# Patient Record
Sex: FEMALE | Race: WHITE | NOT HISPANIC OR LATINO | Employment: UNEMPLOYED | URBAN - METROPOLITAN AREA
[De-identification: names, ages, dates, MRNs, and addresses within clinical notes are randomized per-mention and may not be internally consistent; named-entity substitution may affect disease eponyms.]

---

## 2022-07-30 ENCOUNTER — HOSPITAL ENCOUNTER (EMERGENCY)
Facility: HOSPITAL | Age: 32
Discharge: HOME | End: 2022-07-30
Attending: FAMILY MEDICINE

## 2022-07-30 VITALS
SYSTOLIC BLOOD PRESSURE: 110 MMHG | RESPIRATION RATE: 18 BRPM | HEART RATE: 97 BPM | DIASTOLIC BLOOD PRESSURE: 91 MMHG | OXYGEN SATURATION: 98 %

## 2022-07-30 DIAGNOSIS — F41.9 ANXIETY: ICD-10-CM

## 2022-07-30 DIAGNOSIS — R11.10 VOMITING: ICD-10-CM

## 2022-07-30 DIAGNOSIS — F10.929 ALCOHOL INTOXICATION (HCC): Primary | ICD-10-CM

## 2022-07-30 LAB
ALBUMIN SERPL BCP-MCNC: 4.4 G/DL (ref 3.5–5)
ALP SERPL-CCNC: 41 U/L (ref 34–104)
ALT SERPL W P-5'-P-CCNC: 18 U/L (ref 7–52)
AMPHETAMINES SERPL QL SCN: POSITIVE
ANION GAP SERPL CALCULATED.3IONS-SCNC: 8 MMOL/L (ref 4–13)
AST SERPL W P-5'-P-CCNC: 21 U/L (ref 13–39)
BARBITURATES UR QL: NEGATIVE
BASOPHILS # BLD AUTO: 0.09 THOUSANDS/ΜL (ref 0–0.1)
BASOPHILS NFR BLD AUTO: 1 % (ref 0–1)
BENZODIAZ UR QL: NEGATIVE
BILIRUB SERPL-MCNC: 0.34 MG/DL (ref 0.2–1)
BILIRUB UR QL STRIP: NEGATIVE
BUN SERPL-MCNC: 11 MG/DL (ref 5–25)
CALCIUM SERPL-MCNC: 8.1 MG/DL (ref 8.4–10.2)
CHLORIDE SERPL-SCNC: 108 MMOL/L (ref 96–108)
CLARITY UR: CLEAR
CO2 SERPL-SCNC: 25 MMOL/L (ref 21–32)
COCAINE UR QL: NEGATIVE
COLOR UR: YELLOW
CREAT SERPL-MCNC: 0.66 MG/DL (ref 0.6–1.3)
EOSINOPHIL # BLD AUTO: 0.14 THOUSAND/ΜL (ref 0–0.61)
EOSINOPHIL NFR BLD AUTO: 2 % (ref 0–6)
ERYTHROCYTE [DISTWIDTH] IN BLOOD BY AUTOMATED COUNT: 12.2 % (ref 11.6–15.1)
ETHANOL SERPL-MCNC: 295 MG/DL
EXT PREG TEST URINE: NEGATIVE
EXT. CONTROL ED NAV: NORMAL
FLUAV RNA RESP QL NAA+PROBE: NEGATIVE
FLUBV RNA RESP QL NAA+PROBE: NEGATIVE
GFR SERPL CREATININE-BSD FRML MDRD: 119 ML/MIN/1.73SQ M
GLUCOSE SERPL-MCNC: 109 MG/DL (ref 65–140)
GLUCOSE SERPL-MCNC: 88 MG/DL (ref 65–140)
GLUCOSE UR STRIP-MCNC: NEGATIVE MG/DL
HCT VFR BLD AUTO: 41.6 % (ref 34.8–46.1)
HGB BLD-MCNC: 13.6 G/DL (ref 11.5–15.4)
HGB UR QL STRIP.AUTO: NEGATIVE
IMM GRANULOCYTES # BLD AUTO: 0.02 THOUSAND/UL (ref 0–0.2)
IMM GRANULOCYTES NFR BLD AUTO: 0 % (ref 0–2)
KETONES UR STRIP-MCNC: NEGATIVE MG/DL
LEUKOCYTE ESTERASE UR QL STRIP: NEGATIVE
LIPASE SERPL-CCNC: 52 U/L (ref 11–82)
LYMPHOCYTES # BLD AUTO: 2.64 THOUSANDS/ΜL (ref 0.6–4.47)
LYMPHOCYTES NFR BLD AUTO: 35 % (ref 14–44)
MCH RBC QN AUTO: 32.2 PG (ref 26.8–34.3)
MCHC RBC AUTO-ENTMCNC: 32.7 G/DL (ref 31.4–37.4)
MCV RBC AUTO: 99 FL (ref 82–98)
METHADONE UR QL: NEGATIVE
MONOCYTES # BLD AUTO: 0.55 THOUSAND/ΜL (ref 0.17–1.22)
MONOCYTES NFR BLD AUTO: 7 % (ref 4–12)
NEUTROPHILS # BLD AUTO: 4.14 THOUSANDS/ΜL (ref 1.85–7.62)
NEUTS SEG NFR BLD AUTO: 55 % (ref 43–75)
NITRITE UR QL STRIP: NEGATIVE
NRBC BLD AUTO-RTO: 0 /100 WBCS
OPIATES UR QL SCN: NEGATIVE
OXYCODONE+OXYMORPHONE UR QL SCN: NEGATIVE
PCP UR QL: NEGATIVE
PH UR STRIP.AUTO: 6 [PH]
PLATELET # BLD AUTO: 273 THOUSANDS/UL (ref 149–390)
PMV BLD AUTO: 9.4 FL (ref 8.9–12.7)
POTASSIUM SERPL-SCNC: 3.5 MMOL/L (ref 3.5–5.3)
PROT SERPL-MCNC: 7 G/DL (ref 6.4–8.4)
PROT UR STRIP-MCNC: NEGATIVE MG/DL
RBC # BLD AUTO: 4.22 MILLION/UL (ref 3.81–5.12)
RSV RNA RESP QL NAA+PROBE: NEGATIVE
SARS-COV-2 RNA RESP QL NAA+PROBE: NEGATIVE
SODIUM SERPL-SCNC: 141 MMOL/L (ref 135–147)
SP GR UR STRIP.AUTO: 1.01 (ref 1–1.03)
THC UR QL: POSITIVE
UROBILINOGEN UR QL STRIP.AUTO: 0.2 E.U./DL
WBC # BLD AUTO: 7.58 THOUSAND/UL (ref 4.31–10.16)

## 2022-07-30 PROCEDURE — 81025 URINE PREGNANCY TEST: CPT | Performed by: FAMILY MEDICINE

## 2022-07-30 PROCEDURE — 99284 EMERGENCY DEPT VISIT MOD MDM: CPT

## 2022-07-30 PROCEDURE — 82948 REAGENT STRIP/BLOOD GLUCOSE: CPT

## 2022-07-30 PROCEDURE — 36415 COLL VENOUS BLD VENIPUNCTURE: CPT | Performed by: FAMILY MEDICINE

## 2022-07-30 PROCEDURE — 96374 THER/PROPH/DIAG INJ IV PUSH: CPT

## 2022-07-30 PROCEDURE — 85025 COMPLETE CBC W/AUTO DIFF WBC: CPT | Performed by: FAMILY MEDICINE

## 2022-07-30 PROCEDURE — 96361 HYDRATE IV INFUSION ADD-ON: CPT

## 2022-07-30 PROCEDURE — 0241U HB NFCT DS VIR RESP RNA 4 TRGT: CPT | Performed by: FAMILY MEDICINE

## 2022-07-30 PROCEDURE — 96375 TX/PRO/DX INJ NEW DRUG ADDON: CPT

## 2022-07-30 PROCEDURE — 99285 EMERGENCY DEPT VISIT HI MDM: CPT | Performed by: FAMILY MEDICINE

## 2022-07-30 PROCEDURE — 81003 URINALYSIS AUTO W/O SCOPE: CPT | Performed by: FAMILY MEDICINE

## 2022-07-30 PROCEDURE — 83690 ASSAY OF LIPASE: CPT | Performed by: EMERGENCY MEDICINE

## 2022-07-30 PROCEDURE — 82075 ASSAY OF BREATH ETHANOL: CPT | Performed by: FAMILY MEDICINE

## 2022-07-30 PROCEDURE — 82077 ASSAY SPEC XCP UR&BREATH IA: CPT | Performed by: FAMILY MEDICINE

## 2022-07-30 PROCEDURE — 80053 COMPREHEN METABOLIC PANEL: CPT | Performed by: EMERGENCY MEDICINE

## 2022-07-30 PROCEDURE — 80307 DRUG TEST PRSMV CHEM ANLYZR: CPT | Performed by: FAMILY MEDICINE

## 2022-07-30 RX ORDER — ONDANSETRON 2 MG/ML
INJECTION INTRAMUSCULAR; INTRAVENOUS
Status: COMPLETED
Start: 2022-07-30 | End: 2022-07-30

## 2022-07-30 RX ORDER — ONDANSETRON 2 MG/ML
4 INJECTION INTRAMUSCULAR; INTRAVENOUS ONCE
Status: COMPLETED | OUTPATIENT
Start: 2022-07-30 | End: 2022-07-30

## 2022-07-30 RX ORDER — LORAZEPAM 2 MG/ML
0.5 INJECTION INTRAMUSCULAR ONCE
Status: COMPLETED | OUTPATIENT
Start: 2022-07-30 | End: 2022-07-30

## 2022-07-30 RX ADMIN — SODIUM CHLORIDE 1000 ML: 0.9 INJECTION, SOLUTION INTRAVENOUS at 16:20

## 2022-07-30 RX ADMIN — LORAZEPAM 0.5 MG: 2 INJECTION INTRAMUSCULAR; INTRAVENOUS at 16:23

## 2022-07-30 RX ADMIN — ONDANSETRON 4 MG: 2 INJECTION INTRAMUSCULAR; INTRAVENOUS at 16:20
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
## 2022-08-01 ENCOUNTER — TELEPHONE (OUTPATIENT)
Dept: ADMISSIONS | Facility: HOSPITAL | Age: 32
End: 2022-08-01
Payer: COMMERCIAL
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
## 2022-08-02 ENCOUNTER — TELEPHONE (OUTPATIENT)
Dept: PSYCHIATRY | Facility: HOSPITAL | Age: 32
End: 2022-08-02
Payer: COMMERCIAL

## 2022-08-02 NOTE — TELEPHONE ENCOUNTER
"LCSW called and spoke to pt regarding PHP evals scheduled for tomorrow.  Pt is aware [facility] PHP is abstinence based and reports she will feels able to maintain her sobriety at this time.  Reports \"strong\" family support system to assist in maintaining sobriety.  Pt reports she can stay away from substances and only struggles with then \"when everything is falling apart\" and is hopeful to gain more skills to cope.  LCSW explained evaluations tomorrow are to ensure our PHP is the best fit for her needs at this time and if her needs change in the future - such as she finds it difficult to maintain sobriety - we may have to look at alternative options for treatment.  Pt expressed understanding of the above.  Pt shared she is prescribed Vyvanse and inquired if that medication was permissible at the [facility].  LCSW explained it is, however, we recommended all pts to abstain from any non-prescribed substances.  Pt reports last use of THC was 4 days ago and shared concerns it would still show up in her system.  LCSW shared expectation is for her to remain sober moving forward.  Pt expressed understanding.  Pt shared she has address for [facility] and plans to attend evals tomorrow.       "

## 2022-08-03 ENCOUNTER — PHP (OUTPATIENT)
Dept: PSYCHIATRY | Facility: HOSPITAL | Age: 32
End: 2022-08-03
Attending: SOCIAL WORKER
Payer: COMMERCIAL

## 2022-08-03 ENCOUNTER — APPOINTMENT (OUTPATIENT)
Dept: LAB | Facility: CLINIC | Age: 32
End: 2022-08-03
Attending: NURSE PRACTITIONER
Payer: COMMERCIAL

## 2022-08-03 ENCOUNTER — NURSE ONLY (OUTPATIENT)
Dept: PSYCHIATRY | Facility: HOSPITAL | Age: 32
End: 2022-08-03
Payer: COMMERCIAL

## 2022-08-03 VITALS
OXYGEN SATURATION: 100 % | HEIGHT: 64 IN | RESPIRATION RATE: 18 BRPM | SYSTOLIC BLOOD PRESSURE: 124 MMHG | DIASTOLIC BLOOD PRESSURE: 94 MMHG | HEART RATE: 86 BPM

## 2022-08-03 DIAGNOSIS — F31.81 BIPOLAR II DISORDER (CMS/HCC): Primary | ICD-10-CM

## 2022-08-03 DIAGNOSIS — F13.20 MODERATE BENZODIAZEPINE USE DISORDER (CMS/HCC): ICD-10-CM

## 2022-08-03 DIAGNOSIS — F42.2 MIXED OBSESSIONAL THOUGHTS AND ACTS: ICD-10-CM

## 2022-08-03 DIAGNOSIS — F12.10 CANNABIS USE DISORDER, MILD, ABUSE: ICD-10-CM

## 2022-08-03 DIAGNOSIS — F10.20 ALCOHOL USE DISORDER, MODERATE, DEPENDENCE (CMS/HCC): ICD-10-CM

## 2022-08-03 DIAGNOSIS — F13.10: ICD-10-CM

## 2022-08-03 DIAGNOSIS — F41.9 ANXIETY DISORDER, UNSPECIFIED TYPE: ICD-10-CM

## 2022-08-03 LAB
AMPHET UR QL SCN: POSITIVE
BARBITURATES UR QL SCN: NOT DETECTED
BENZODIAZ UR QL SCN: NOT DETECTED
CANNABINOIDS UR QL SCN: NOT DETECTED
COCAINE UR QL SCN: NOT DETECTED
OPIATES UR QL SCN: NOT DETECTED
PCP UR QL SCN: NOT DETECTED

## 2022-08-03 PROCEDURE — 80307 DRUG TEST PRSMV CHEM ANLYZR: CPT | Performed by: NURSE PRACTITIONER

## 2022-08-03 PROCEDURE — 90792 PSYCH DIAG EVAL W/MED SRVCS: CPT | Performed by: NURSE PRACTITIONER

## 2022-08-03 PROCEDURE — 90791 PSYCH DIAGNOSTIC EVALUATION: CPT

## 2022-08-03 RX ORDER — LISDEXAMFETAMINE DIMESYLATE 60 MG/1
60 CAPSULE ORAL
COMMUNITY
Start: 2022-07-16

## 2022-08-03 RX ORDER — ALPRAZOLAM 0.5 MG/1
0.5 TABLET ORAL AS NEEDED
COMMUNITY
Start: 2022-07-16 | End: 2022-08-03

## 2022-08-03 RX ORDER — SERTRALINE HYDROCHLORIDE 100 MG/1
100 TABLET, FILM COATED ORAL
COMMUNITY
Start: 2022-07-15 | End: 2022-08-03

## 2022-08-03 ASSESSMENT — COGNITIVE AND FUNCTIONAL STATUS - GENERAL
THOUGHT_PROCESS: WORRY
LIBIDO: NON-CONTRIBUTORY
PERCEPTUAL FUNCTION: NORMAL
IMPULSE CONTROL: IMPAIRED, MODERATELY
AROUSAL LEVEL: ALERT
THOUGHT_CONTENT: GUARDED;SUSPICIOUS
RECENT MEMORY: WNL
ORIENTATION: FULLY ORIENTED
EYE_CONTACT: WNL
DELUSIONS: NONE OR AGE APPROPRIATE
SLEEP_WAKE_CYCLE: DECREASED
CONCENTRATION: WNL
EST. PREMORBID INTELLIGENCE: AVERAGE
APPEARANCE: WELL GROOMED
AFFECT: FULL RANGE;TEARFUL
PSYCHOMOTOR FUNCTIONING: WNL
INSIGHT: IMPAIRED, MODERATELY
APPETITE: DECREASED
MOOD: ANXIOUS;DEPRESSED
REMOTE MEMORY: WNL
ATTENTION: WNL
SPEECH: REGULAR

## 2022-08-03 ASSESSMENT — ENCOUNTER SYMPTOMS
NAUSEA: 1
HEADACHES: 0
NEUROLOGICAL NEGATIVE: 1
ENDOCRINE NEGATIVE: 1
EYES NEGATIVE: 1
SLEEP DISTURBANCE: 1
DIAPHORESIS: 0
APPETITE CHANGE: 1
MUSCULOSKELETAL NEGATIVE: 1
FATIGUE: 1
DYSPHORIC MOOD: 1
RESPIRATORY NEGATIVE: 1
DIZZINESS: 0
HEMATOLOGIC/LYMPHATIC NEGATIVE: 1
NAUSEA: 0
CARDIOVASCULAR NEGATIVE: 1
UNEXPECTED WEIGHT CHANGE: 1
NERVOUS/ANXIOUS: 1
TREMORS: 1

## 2022-08-03 ASSESSMENT — PAIN SCALES - GENERAL: PAINLEVEL: 0-NO PAIN

## 2022-08-03 NOTE — PROGRESS NOTES
"INITIAL PSYCHIATRY NURSING ASSESSMENT    Renate is a 32 y/o referred to PHP by [facility] for anxiety depression.  Renate endorses current anxiety and depression.  She denies SI, HI, AVH and SIB.  Renate endorses substance use daily marijuana, wine or beer 3 times weekly 3 glasses per episode, history of benzodiazepine misuse does not endorse recent use.  Renate presents pleasant, anxious affect and mood; ADL's maintained, eye contact good, responses to assessment questions varied from dismissive to explanatory.  Renate states that she has been able to maintain ADL's without issue, is having difficulty with completing tasks and sometimes feels overwhelmed by task, states she is not getting proper sleep balance and appetite has been effected in recent months where she had lost 10-15 pounds since February but has since regained weight to what she states is her baseline weight.     PHP notification letter yes    Covid Vacc status reports being vaccinated  Recent exposure denies        History Reviewed: Yes, no changes.        Review of Systems   Constitutional: Positive for appetite change and unexpected weight change.        Lost 10-15 pounds in feb has since regained   HENT: Negative.    Eyes: Negative.         Uses glasses and contacts     Respiratory: Negative.    Cardiovascular: Negative.    Gastrointestinal: Positive for nausea.        During times of anxiety experiences nausea   Endocrine: Negative.    Genitourinary: Negative.    Musculoskeletal: Negative.    Skin: Negative.    Allergic/Immunologic: Positive for environmental allergies.   Neurological: Negative.    Hematological: Negative.    Psychiatric/Behavioral: Positive for dysphoric mood and sleep disturbance. The patient is nervous/anxious.         Depression       Vitals:    08/03/22 1047   BP: (!) 124/94   BP Location: Right upper arm   Patient Position: Sitting   Pulse: 86   Resp: 18   SpO2: 100%   Height: 1.626 m (5' 4\")   PainSc: 0-No pain       Physical " Exam    Labs Reviewed  I have reviewed the patient's labs.  Significant abnormals are Ethanol Lvl <10 mg/dL 295 High   . on 7/30/22    Does the patient worry about falling?  no  Has the patient fallen in the last 3 months?  no    Screenings done this visit:         PHQ-9: Total Score-OWL Transcribed: 15  Thoughts that You Would be Better Off Dead or of Hurting Yourself in Some Way: 0-->not at all  MONICA-7 Total Score-OWL Transcribed: 18            Metabolic Monitoring Log Completed/Updated:  Not Indicated    Assessment/Plan: PHP pending UDS results     Time spent with patient:  20  Lizzeth Cristina RN @ 10:48 AM

## 2022-08-03 NOTE — PROGRESS NOTES
Report of last drink 7/30/2022 unk amount of gin    A&O x3; no HA; no A/V or tactile hallucinations; no change in night sweats (has been experiencing night sweats in relation to mass on thyroid), no change in anxiety (no increase), no n/v    Fine tremors felt left hand; hypertension 124/94
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
## 2022-08-03 NOTE — PROGRESS NOTES
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 F/U with treatment goals as outlined in treatment plan.  Patient to F/U with local ED or call 911 should SI/HI arise.  Visit Diagnosis:    ICD-10-CM ICD-9-CM   1. Bipolar II disorder (CMS/HCC)  F31.81 296.89   2. Anxiety disorder, unspecified type  F41.9 300.00   3. Alcohol use disorder, moderate, dependence (CMS/HCC)  F10.20 303.90   4. Cannabis use disorder, mild, abuse  F12.10 305.20   5. Mild benzodiazepine use disorder (CMS/HCC)  F13.10 305.40       Time  Start Time: 0835  End Time: 1040  Total Time: 125  Radha Carpenter, LCSW @ 1:22 PM

## 2022-08-04 ENCOUNTER — TELEPHONE (OUTPATIENT)
Dept: PSYCHIATRY | Facility: HOSPITAL | Age: 32
End: 2022-08-04
Payer: COMMERCIAL

## 2022-08-04 NOTE — TELEPHONE ENCOUNTER
LCSW called and LVM for pt informing her she can start PHP group tomorrow at 9:30am.  Main office number provided with any questions or concerns.

## 2022-08-05 ENCOUNTER — DOCUMENTATION (OUTPATIENT)
Dept: PSYCHIATRY | Facility: HOSPITAL | Age: 32
End: 2022-08-05

## 2022-08-05 ENCOUNTER — NURSE ONLY (OUTPATIENT)
Dept: PSYCHIATRY | Facility: HOSPITAL | Age: 32
End: 2022-08-05
Payer: COMMERCIAL

## 2022-08-05 ENCOUNTER — PHP (OUTPATIENT)
Dept: PSYCHIATRY | Facility: HOSPITAL | Age: 32
End: 2022-08-05
Attending: NURSE PRACTITIONER
Payer: COMMERCIAL

## 2022-08-05 VITALS — SYSTOLIC BLOOD PRESSURE: 129 MMHG | HEART RATE: 96 BPM | DIASTOLIC BLOOD PRESSURE: 89 MMHG

## 2022-08-05 DIAGNOSIS — F10.20 ALCOHOL USE DISORDER, MODERATE, DEPENDENCE (CMS/HCC): ICD-10-CM

## 2022-08-05 DIAGNOSIS — F12.10 CANNABIS USE DISORDER, MILD, ABUSE: ICD-10-CM

## 2022-08-05 DIAGNOSIS — F13.20 MODERATE BENZODIAZEPINE USE DISORDER (CMS/HCC): ICD-10-CM

## 2022-08-05 DIAGNOSIS — F31.81 BIPOLAR II DISORDER (CMS/HCC): Primary | ICD-10-CM

## 2022-08-05 DIAGNOSIS — F42.2 MIXED OBSESSIONAL THOUGHTS AND ACTS: ICD-10-CM

## 2022-08-05 PROCEDURE — H0035 MH PARTIAL HOSP TX UNDER 24H: HCPCS | Performed by: COUNSELOR

## 2022-08-05 PROCEDURE — 99214 OFFICE O/P EST MOD 30 MIN: CPT | Performed by: NURSE PRACTITIONER

## 2022-08-05 RX ORDER — BUSPIRONE HYDROCHLORIDE 5 MG/1
5 TABLET ORAL 2 TIMES DAILY
Qty: 30 TABLET | Refills: 0 | Status: SHIPPED | OUTPATIENT
Start: 2022-08-05 | End: 2022-08-11

## 2022-08-05 RX ORDER — LAMOTRIGINE 25 MG/1
25 TABLET ORAL DAILY
Qty: 30 TABLET | Refills: 0 | Status: SHIPPED | OUTPATIENT
Start: 2022-08-05 | End: 2022-08-18 | Stop reason: SDUPTHER

## 2022-08-05 RX ORDER — QUETIAPINE FUMARATE 50 MG/1
50 TABLET, FILM COATED ORAL NIGHTLY
Qty: 15 TABLET | Refills: 0 | Status: SHIPPED | OUTPATIENT
Start: 2022-08-05 | End: 2022-08-18 | Stop reason: SDUPTHER

## 2022-08-05 ASSESSMENT — ENCOUNTER SYMPTOMS
DIZZINESS: 0
FATIGUE: 0
TREMORS: 0

## 2022-08-05 NOTE — GROUP NOTE
Date:  8/5/2022  Start Time:  10:40 AM  End Time:  11:40 AM  Renate Wallace, YOB: 1990  10 members attended group today.    Group Focus:  Goal of group was to establish and build social support, review coping skills, normalize the struggles of being human, and increase self awareness and self compassion.    About the Group:  Clinician started group with a prompting quote/song to facilitate group discussion.  Group engaged in active and supportive discussion. Topics discussed included shame vs. Guilt, boundaries and prioritizing needs, coping ahead.  Group members were able to offer insightful and supportive feedback and suggestions about how to manage difficult situations.  Group ended with a meditation/song.  Kathrine Doung MD was onsite when services were rendered.        Patient  was an active group participant.  Assessment/observation of group participation/presentation: Renate appeared to be engaged and initiated sharing about her sense of feeling like she is flawed or a burden. She discussed at this time trying to increase her awareness of these thoughts and will benefit from self-compassion and CBT techniques. She appeared to be taking notes and commented as she related about parenting stress with another peer.  Treatment plan areas addressed: Depression, Anxiety, Low Self Esteem, Self Care, Mood dysregulation, Parenting Stress, CBT skills and Self-compassion  Visit Diagnosis:      ICD-10-CM ICD-9-CM   1. Bipolar II disorder (CMS/HCC)  F31.81 296.89   2. Alcohol use disorder, moderate, dependence (CMS/HCC)  F10.20 303.90   3. Moderate benzodiazepine use disorder (CMS/HCC)  F13.20 304.10   4. Cannabis use disorder, mild, abuse  F12.10 305.20   5. Mixed obsessional thoughts and acts  F42.2 300.3       Plan: Continue with PHP   Pt to F/U with treatment goals as outlined in treatment plan.  Pt to F/U with local ED/call 911 should SI/HI arises.    Radha Carpenter LCSW

## 2022-08-05 NOTE — GROUP NOTE
Date:  8/5/2022  Start Time:  12:10 PM  End Time:   1:10 PM  Renate Wallace, YOB: 1990  Psychoeducational/Skills Based Group  10 members attended group today    Group Focus: Goal of group was to introduce skills and psychoeducation related to topic of PHP day.   Group members were provided instruction and opportunities to practice presented skills.  Clinician answered questions as they arose and shared how presented skills can be utilized on a daily basis to increase emotional wellness.      About the Group: Self Compassion/Self Love Session 10: Self Compassion/Self Love Psycho Ed/Skills Based Group Summary   Topic of Curriculum was “Self Compassion/Self Love”. Clinician showed Lou Harris’s video describing Self-Compassion to help group members understand the use of self compassion as a coping skill. Clinician facilitated discussion as a group directed toward “negative thoughts about self that can be transformed into self-compassionate statements.”  For example, asking members to share negative thoughts that often go through their mind, “Would you say that to someone you love? What might you say instead?” Clinician introduced a few examples of Self-compassionate phrases: “I am only human.” “I am doing the best I can.” “This is really hard right now and others have felt this way too.”  Group members then discussed their experiences and potential utilization of self compassion to help them in the healing process. Clinician closed group session with a 2 minute breathing meditation to help group members practice grounding techniques    Kathrine Duong MD was onsite when services were rendered.          Patient  was quiet but attentive.  Assessment/observation of group participation/presentation: Renate was alert and had good non verbal participation.  She was participatory in all skill demonstrations.   Treatment plan areas addressed: Depression, Anxiety, Relationship issues/Communication skills,  Employment/vocational stress and Parenting Stress  Visit Diagnosis:      ICD-10-CM ICD-9-CM   1. Bipolar II disorder (CMS/HCC)  F31.81 296.89   2. Alcohol use disorder, moderate, dependence (CMS/HCC)  F10.20 303.90   3. Moderate benzodiazepine use disorder (CMS/HCC)  F13.20 304.10   4. Cannabis use disorder, mild, abuse  F12.10 305.20   5. Mixed obsessional thoughts and acts  F42.2 300.3       Plan: Continue with PHP   Pt to F/U with treatment goals as outlined in treatment plan.  Pt to F/U with local ED/call 911 should SI/HI arises.    Gina Stewart, LPC

## 2022-08-05 NOTE — PROGRESS NOTES
Slight decrease in BP since nurse assessment with denial of any other associated s/s.     Vitals:    08/05/22 1117   BP: 129/89   Pulse: 96         Renate does however endorse noticing fine tremors, tremors are observable. She denies any previous withdrawal however was not aware of some s/s and has since been educated on s/s to watch for.  Discussed other contributing factors such as increased anxiety, stress, poor sleep, etc.  Renate voices being more aware of s/s of withdrawal and has been more cognizant in monitoring signs.  Renate acknowledges understanding of information discussed.      Renate is aware of how to contact office prior to next appointment with any issues/concerns, after hours resources etc, patient instructed to call 911 or go to nearest ED if thoughts of self harm, suicide , or violence towards others occurs.

## 2022-08-05 NOTE — GROUP NOTE
"Date: 8/5/2022  Start Time:   9:30 AM  End Time: 10:30 AM    Renate Wallace, YOB: 1990,  was an active group participant.    Community Check In Group  10 attended group today.     Group Focus: Goal of group was to welcome new and returning PHP members to the [facility], check in regarding group member needs, and assess safety.    About the Group: Clinician reviewed [facility] Group Code of Conduct and answered any questions that arose.  Clinician welcomed new members to the group and facilitated brief introductions of group members to one another.  Introduced topic/theme for the treatment day:Self Compassion.  Encouraged group members to request support throughout the day as needed.  Clinician reviewed group members’ Morning Reflection Sheets and did a verbal check in with each group member regarding safety (SI/HI/self harm), safety planning, medication compliance, if they needed to consult with anyone today and individual treatment needs/goals for the day.  Session ended with a brief meditation/calming activity.   Kathrine Duong MD was on site when services rendered.      Morning Reflection:   Depression:  2/5  Anxiety:  4/5  Anger:  2/5  Thoughts of taking one's own life today?:  0/5 - None  Self Injury:  0/5 - None  Engaged in Self Injury since yesterday: No    Thoughts of hurting other people today?:  0/5 - None  Compulsive Behaviors?:  Compulsive Behaviors Boolean    Trigger?:  Anxiety this am    How?:  Weirdness with all items in sink needing to be \"soaked\"  Are you able to follow your safety plan?: Yes      I can/will:  Call someone, Use a coping skill, Use grounding with my 5 senses, Journal, Listen to music and Call 911 if I'm unsafe  Substance Use: No    Self Care: Yes      I completed my self care activity last night::  I put phone away, spent time outdors with kids and was mindful    I am satisfied with my daily hygiene: Yes    Nourish:  Yes    Number of meals eaten yesterday:  2    Describe:  Normal " and Nutritious  Sleep:  Yes    Used sleep aid?: Yes (Benadryl for allergies)      Describe sleep:  Restful    Number of hours:  8, good sleep but unpleasant dreams  Social Interaction:  Yes    Social Interaction:  Moderate and Isolating    Moderate with:  Family    Isolating with:  FriendsPhysical Activity:  Physical ActivityMindful Activity:  No Mindful Activity  Medication:  Yes    Medication:  Prescribed  Thought Content:  Clear, Racing Thoughts and Irritable  Which coping skills did you use yesterday? How did they help or not help?  What barriers did you face?:  Practiced deep breathing during periods of anxiety w/ appt and public transit, practiced mindfulness and challenged negative/intrusive thoughts, short nap with my daughter, fed my body nutritious foods  Pt reported significant or positive event/step:  Eating well, eating enough and eating in a balanced way, Nap w/ Rosa  Pt identified goal for the treatment day:  Balance during weekend - allowing myself to rest while still being productive, not putting too much pressure on self, coping during weekend and not reacting to comments/actions that may be triggering from   Pt treatment plan areas addressed:  Depression, Anxiety, Relationship issues/Communication Skills, Self care, Mood dysregulation, Coping skills, Self-compassion and Mindfulness  Pt requested consult with:  None      Visit Diagnosis:      ICD-10-CM ICD-9-CM   1. Bipolar II disorder (CMS/HCC)  F31.81 296.89   2. Alcohol use disorder, moderate, dependence (CMS/HCC)  F10.20 303.90   3. Moderate benzodiazepine use disorder (CMS/HCC)  F13.20 304.10   4. Cannabis use disorder, mild, abuse  F12.10 305.20   5. Mixed obsessional thoughts and acts  F42.2 300.3       Assessment/Observations:  Pt shared feeling anxious to begin the program and be away from her children. She wrote on her sheet about feeling like a burden on her family but did not verbalize this. She shared about anger at her   "and herself for getting to this \"bad place\" mentally. She reports some compulsive picking at her hands and was offered a fidget, and also discussed having compulsions around needing things around her house to be a particular way.   Plan:  Continue with PHP   Pt to F/U with treatment goals as outlined in treatment plan.  Pt to F/U with local ED/call 911 should SI/HI arises.    Radha Carpenter LCSW          "

## 2022-08-05 NOTE — PROGRESS NOTES
LCSW checked in with pt on break to schedule ind. Session for Monday at 8:30 and to encourage pt to contact insurance to obtain OP therapy (Layne will also be working on referrals for pt). LCSW explained recommended stepdown at this point will probably be a dual dx IOP. Pt relayed understanding.

## 2022-08-05 NOTE — PROGRESS NOTES
[facility]  [address] [place] [address]  [phone]  [email or web address]  **Email about availability**    Maik Esqueda ALTILIA, [facility]  [email or web address]  [address] [place] [id number]  Dr. Maik Esqueda: [phone]  Terrie Esqueda: [phone]  Jet Rajan: [phone]  Deepika Spring: [phone]    [address] [address] [phone]  [email or web address]   **Email about availability**    [facility] (owner Brendan Randolph) Owner Brendan phone: [phone] or Office: [phone] & website: [email or web address]  Offers individual and groups for Anxiety, offering in person and virtual, located in [place].  Accepts most insurances and accepting new patients.     [facility]   Phone [phone], website: [email or web address]  Located in [place], offers in person & virtual appointments  Accepted Insurance Plans: Aetna, AmeriHealth, [employer],  BlueCross and [facility], Cigna and [employer], [employer], [employer] BC/BS, Highmark, Horizon BC/BS, [facility], [facility], [facility], Medicare, Optum, [facility], Personal Choice, Quest & UnitedHealthcare [facility]

## 2022-08-05 NOTE — GROUP NOTE
Date: 8/5/2022  Start Time:  1:20 PM  End Time:   2:20 PM  Renate Wallace, YOB: 1990,  was an active group participant.    Wrap Up Group  10 attended group today.   Group Focus: Goal of group was to wrap up and process the treatment day.  Assist  members in planning for the evening ahead and to assess and plan surrounding any  safety needs.      About the Group:  Clinician reviewed group members Afternoon  Reflection Sheets and did a verbal check in with each group member regarding safety  (SI/HI/self harm) and any necessary safety planning .  Session ended with a brief  meditation/calming activity.  Kathrine Duong MD was onsite when  services rendered.        Afternoon Reflection:   Depression:  1/5  Anxiety:  2/5  Anger:  0/5  Thoughts of taking one's own life today?:  0/5 - None  Self Injury:  0/5 - None  Engaged in self-injury?  No Engaged in Self Injury since yesterday  Thoughts of hurting other people today?:  0/5 - None  Compulsive Behaviors?:  0-None  Compulsive Behaviors?: no Compulsive Behaviors Boolean  Are you able to follow our safety plan?: Yes      I can/will:  Call someone, Use grounding with my 5 senses, Journal, Call 911 if I'm unsafe, Use a coping skill and Listen to music  The most significant moment of the day or insight for me was...:  Self compassion dicussion  Three things I am grateful for today are:  Time to myself, being empowered, health is intact  Did you meet your goal for today? If not, what might you do tomorrow or this week to achieve it?:  Unsure  My evening self-care plan is:  Make bedroom into a sanctuary and stay balanced in approach.      Pt treatment plan areas addressed: Depression, Anxiety, Relationship issues/Communication skills, Employment/vocational stress, Self Care and Parenting Stress    Visit Diagnosis:      ICD-10-CM ICD-9-CM   1. Bipolar II disorder (CMS/HCC)  F31.81 296.89   2. Alcohol use disorder, moderate, dependence (CMS/HCC)  F10.20 303.90    3. Moderate benzodiazepine use disorder (CMS/HCC)  F13.20 304.10   4. Cannabis use disorder, mild, abuse  F12.10 305.20   5. Mixed obsessional thoughts and acts  F42.2 300.3       Assessment/Observations:  Renate was open in check out with her life experience and offered empathic support to a struggling peer.  She reported a positive day one in txt.   Plan: Continue with PHP   Pt to F/U with treatment goals as outlined in treatment plan.  Pt to F/U with local ED/call 911 should SI/HI arises.    Gina Stewart, LPC

## 2022-08-08 ENCOUNTER — PHP (OUTPATIENT)
Dept: PSYCHIATRY | Facility: HOSPITAL | Age: 32
End: 2022-08-08
Attending: NURSE PRACTITIONER
Payer: COMMERCIAL

## 2022-08-08 ENCOUNTER — PHP (OUTPATIENT)
Dept: PSYCHIATRY | Facility: HOSPITAL | Age: 32
End: 2022-08-08
Payer: COMMERCIAL

## 2022-08-08 ENCOUNTER — TELEPHONE (OUTPATIENT)
Dept: PSYCHIATRY | Facility: HOSPITAL | Age: 32
End: 2022-08-08

## 2022-08-08 ENCOUNTER — DOCUMENTATION (OUTPATIENT)
Dept: PSYCHIATRY | Facility: HOSPITAL | Age: 32
End: 2022-08-08

## 2022-08-08 DIAGNOSIS — F12.10 CANNABIS USE DISORDER, MILD, ABUSE: ICD-10-CM

## 2022-08-08 DIAGNOSIS — F13.20 MODERATE BENZODIAZEPINE USE DISORDER (CMS/HCC): ICD-10-CM

## 2022-08-08 DIAGNOSIS — F31.81 BIPOLAR II DISORDER (CMS/HCC): Primary | ICD-10-CM

## 2022-08-08 DIAGNOSIS — F42.2 MIXED OBSESSIONAL THOUGHTS AND ACTS: ICD-10-CM

## 2022-08-08 DIAGNOSIS — F10.20 ALCOHOL USE DISORDER, MODERATE, DEPENDENCE (CMS/HCC): ICD-10-CM

## 2022-08-08 DIAGNOSIS — F42.9 OBSESSIVE-COMPULSIVE DISORDER, UNSPECIFIED TYPE: ICD-10-CM

## 2022-08-08 PROCEDURE — 99214 OFFICE O/P EST MOD 30 MIN: CPT | Performed by: NURSE PRACTITIONER

## 2022-08-08 PROCEDURE — H0035 MH PARTIAL HOSP TX UNDER 24H: HCPCS | Performed by: COUNSELOR

## 2022-08-08 PROCEDURE — 90834 PSYTX W PT 45 MINUTES: CPT

## 2022-08-08 ASSESSMENT — COGNITIVE AND FUNCTIONAL STATUS - GENERAL
APPEARANCE: WELL GROOMED
ATTENTION: WNL
APPETITE: NO CHANGE
MOOD: EUTHYMIC (NORMAL)
RECENT MEMORY: WNL
AROUSAL LEVEL: ALERT
CONCENTRATION: WNL
SPEECH: REGULAR
DELUSIONS: NONE OR AGE APPROPRIATE
THOUGHT_PROCESS: TANGENTIAL
PERCEPTUAL FUNCTION: NORMAL
PSYCHOMOTOR FUNCTIONING: WNL
LIBIDO: NON-CONTRIBUTORY
ORIENTATION: FULLY ORIENTED
IMPULSE CONTROL: IMPAIRED, MINIMALLY
SLEEP_WAKE_CYCLE: NO CHANGE
THOUGHT_CONTENT: APPROPRIATE
REMOTE MEMORY: WNL
EST. PREMORBID INTELLIGENCE: AVERAGE
EYE_CONTACT: WNL
INSIGHT: IMPAIRED, MINIMALLY
AFFECT: FULL RANGE

## 2022-08-08 ASSESSMENT — ENCOUNTER SYMPTOMS
DIZZINESS: 0
NAUSEA: 0
HEADACHES: 0
FATIGUE: 0
TREMORS: 0

## 2022-08-08 NOTE — TELEPHONE ENCOUNTER
LCSW called pt’s sister Kaila for collateral as part of PHP. She reports pt has been struggling since the holidays, has had multiple times of “crises” where her  would loop them in as a family, where pt was not acting like herself and making poor decisions. She reports that two weeks prior to the last hospital visit for drinking on 7/30, pt had [place] much [place] [employer] and dad had to pick her up. Kaila states that in the past, pt would stop medications early because of how they made her feel. She states pt has awareness of coping skills but is not good about implementing them. Scheduled family session for 8/17/22 2:20 in person.

## 2022-08-08 NOTE — GROUP NOTE
Date: 8/8/2022  Start Time:   9:30 AM  End Time: 10:30 AM    Renate Wallace, YOB: 1990,  was an active group participant.    Community Check In Group  9 attended group today.     Group Focus: Goal of group was to welcome new and returning PHP members to the [facility], check in regarding group member needs, and assess safety.    About the Group: Clinician reviewed [facility] Group Code of Conduct and answered any questions that arose.  Clinician welcomed new members to the group and facilitated brief introductions of group members to one another.  Introduced topic/theme for the treatment day:Mindfulness.  Encouraged group members to request support throughout the day as needed.  Clinician reviewed group members’ Morning Reflection Sheets and did a verbal check in with each group member regarding safety (SI/HI/self harm), safety planning, medication compliance, if they needed to consult with anyone today and individual treatment needs/goals for the day.  Session ended with a brief meditation/calming activity.   Jacobo Benitez DO was on site when services rendered.      Morning Reflection:   Depression:  0/5  Anxiety:  2/5  Anger:  0/5  Thoughts of taking one's own life today?:  0/5 - None  Self Injury:  0/5 - None  Engaged in Self Injury since yesterday: No    Thoughts of hurting other people today?:  0/5 - None  Compulsive Behaviors?:  No Compulsive Behaviors Boolean  Are you able to follow your safety plan?: Yes      I can/will:  Call someone, Journal, Use a coping skill, Listen to music and Use grounding with my 5 senses  Substance Use: No    Self Care: Yes      I completed my self care activity last night::  Exercise and journal    I am satisfied with my daily hygiene: Yes    Nourish:  Yes    Number of meals eaten yesterday:  2 plus snacks     Describe:  Normal  Sleep:  Yes    Used sleep aid?: Yes      Describe sleep:  Restful    Number of hours:  7-8 hard falling asleep but once fell alseep it was restful  "  Social Interaction:  Yes    Social Interaction:  Moderate and Isolating    Moderate with:  Family    Isolating with:  Friends  Physical Activity:  Physical Activity    Describe Practice: walked 30 mins   Mindful Activity:  Mindful Activity    Describe Practice:  Journal, and aromatherapy   Medication:  Yes    Medication:  Prescribed  Thought Content:  Clear and Racing Thoughts  Which coping skills did you use yesterday? How did they help or not help?  What barriers did you face?:  Focused on \"staying in my lin\" during conflict and had boundaries/mindfulness. I journaled, exercised and practiced self-compassion   Pt reported significant or positive event/step:  Able to follow through on goal to make bedroom more conducive to sleep with changing alarm clocks, chargers, less screens   Pt identified goal for the treatment day:  I want to continue to add to coping skills and how to prevent or navigate conflict       Visit Diagnosis:      ICD-10-CM ICD-9-CM   1. Bipolar II disorder (CMS/[facility])  F31.81 296.89   2. Alcohol use disorder, moderate, dependence (CMS/[facility])  F10.20 303.90   3. Moderate benzodiazepine use disorder (CMS/[facility])  F13.20 304.10   4. Cannabis use disorder, mild, abuse  F12.10 305.20   5. Mixed obsessional thoughts and acts  F42.2 300.3       Assessment/Observations:  Pt was engaged in group session. Pt shared that she had difficulty falling asleep last night but got restful sleep once she fell asleep. Pt also related with a peer in group session about having inconsistent and disrupted sleep. Pt shared that she tried to navigate conflict and not contribute to conflict. Pt shared that she took some steps to make her bedroom more conducive to sleep. LPC affirmed action steps that pt took over the weekend with coping, self-care, and sleep.   Plan:  Continue with PHP   Pt to F/U with treatment goals as outlined in treatment plan.  Pt to F/U with local ED/call 911 should SI/HI arises.    Edward Garcia, " LPC

## 2022-08-08 NOTE — GROUP NOTE
Date: 8/8/2022  Start Time:  1:20 PM  End Time:   2:20 PM  Renate Wallace, YOB: 1990,  was an active group participant.    Wrap Up Group  9 attended group today.   Group Focus: Goal of group was to wrap up and process the treatment day.  Assist  members in planning for the evening ahead and to assess and plan surrounding any  safety needs.      About the Group:  Clinician reviewed group members Afternoon  Reflection Sheets and did a verbal check in with each group member regarding safety  (SI/HI/self harm) and any necessary safety planning .  Session ended with a brief  meditation/calming activity.  Jacobo Benitez DO was onsite when  services rendered.        Afternoon Reflection:   Depression:  0/5  Anxiety:  2/5  Anger:  0/5  Thoughts of taking one's own life today?:  0/5 - None  Self Injury:  0/5 - None  Engaged in self-injury?  No Engaged in Self Injury since yesterday  Thoughts of hurting other people today?:  0/5 - None  Compulsive Behaviors?:  2-Superficial  Compulsive Behaviors?: Compulsive Behaviors Boolean    Trigger?:  Restlessness feelings leading to anxiety     How?:  Anxious feelings lead to needing figeting, which is helpful.   Are you able to follow our safety plan?: Yes      I can/will:  Call someone, Journal, Use a coping skill, Listen to music and Use grounding with my 5 senses  The most significant moment of the day or insight for me was...:  Practicing the 4-7-8 breathing, I haven't done this skill in a while and it helped calm my brain down. Realizing how important balance is.   Three things I am grateful for today are:  1) My mom; 2) My Kids; 3) Music  Did you meet your goal for today? If not, what might you do tomorrow or this week to achieve it?:  I want to respond some people this evening to stop isolating and I want to be mindful, not hurried and distracted with my responses. I will continue to learn from this program.   My evening self-care plan is:  I am going to find and  practice the list of affirmations I've used previously with success and place them in a place that will encourage daily practice.       Pt treatment plan areas addressed: Depression, Anxiety, Mindfulness and Self-compassion    Visit Diagnosis:      ICD-10-CM ICD-9-CM   1. Bipolar II disorder (CMS/HCC)  F31.81 296.89   2. Alcohol use disorder, moderate, dependence (CMS/HCC)  F10.20 303.90   3. Moderate benzodiazepine use disorder (CMS/HCC)  F13.20 304.10   4. Cannabis use disorder, mild, abuse  F12.10 305.20   5. Mixed obsessional thoughts and acts  F42.2 300.3       Assessment/Observations:  Pt was attentive and vocal this afternoon during the wrap up discussion. Pt was really interested in box breathing and she is feeling less anxious overall this afternoon. Pt wants to practice affirmations this afternoon and look for daily encouragement.   Plan: Continue with PHP   Pt to F/U with treatment goals as outlined in treatment plan.  Pt to F/U with local ED/call 911 should SI/HI arises.    Karyn Albarran, LPC

## 2022-08-08 NOTE — GROUP NOTE
Date:  8/8/2022  Start Time:  10:40 AM  End Time:  11:40 AM  Renate Wallace, YOB: 1990   9 members attended group today.    Group Focus:  Goal of group was to establish and build social support, review coping skills, normalize the struggles of being human, and increase self awareness and self compassion.    About the Group:  Clinician started group with a prompting quote/song to facilitate group discussion.  Group engaged in active and supportive discussion. Topics discussed included racing thoughts, managing multiple MH dx, importance of treating MH dx, shame related to MH dx.  Group members were able to offer insightful and supportive feedback and suggestions about how to manage difficult situations.  Group ended with a meditation/song.  Jacobo Benitez DO was onsite when services were rendered.        Patient  was an active group participant.  Assessment/observation of group participation/presentation: Pt was engaged in group session and related with a peer about having racing thoughts and multiple dx. Pt shared about using substances to help with stopping racing thoughts. Pt shared about feeling shame and invalidation related to MH dx. Pt shared that she needs to acknowledge her dx without letting them consume her, but also needs to treat them so that she does not allow them progress untreated. Pt has insight into the importance of seeking tx at this time. Pt seemed to benefit from interactions with peers.   Treatment plan areas addressed: Depression, Anxiety, Self Care, Mood dysregulation, Coping Skills, Mindfulness, CBT skills, Building strengths/resilience, Self-compassion and Other: SUDS   Visit Diagnosis:      ICD-10-CM ICD-9-CM   1. Bipolar II disorder (CMS/[facility])  F31.81 296.89   2. Alcohol use disorder, moderate, dependence (CMS/HCC)  F10.20 303.90   3. Moderate benzodiazepine use disorder (CMS/HCC)  F13.20 304.10   4. Cannabis use disorder, mild, abuse  F12.10 305.20   5. Mixed obsessional  thoughts and acts  F42.2 300.3       Plan: Continue with PHP   Pt to F/U with treatment goals as outlined in treatment plan.  Pt to F/U with local ED/call 911 should SI/HI arises.    Edward Garcia, LPC

## 2022-08-08 NOTE — PROGRESS NOTES
"PHP Psychotherapy Note    Renate Wallace is a 31 y.o. female who presents for Follow-up.     Treatment Plan areas addressed during this visit:  Trauma, mood dysregulation    Mental Status Exam:  Arousal Level: Alert  Appearance: Well Groomed  Speech: Regular  Psychomotor Functioning: WNL  Eye Contact: WNL  Est. Premorbid Intelligence: Average  Orientation: Fully oriented  Attention: WNL  Concentration: WNL  Recent Memory: WNL  Remote Memory: WNL  Thought Content: Appropriate  Thought Process: Tangential  Insight: Impaired, minimally  Perceptual Function: Normal  Delusions: None or age appropriate  Sleeping: No Change  Appetite: No Change  Libido: Non-Contributory  Affect: Full Range  Mood: Euthymic (normal)     PHQ-9: Total Score-OWL Transcribed: 15  Thoughts that You Would be Better Off Dead or of Hurting Yourself in Some Way: 0-->not at all  MONICA-7 Total Score-OWL Transcribed: 18    [place] Suicide Severity Rating Scale:  Not indicated   C-SSRS Short Version Recent  1. Within the past month, have you wished you were dead or wished you could go to sleep and not wake up?: No (8/3/2022 10:13 AM)  2. Within the past month, have you actually had any thoughts of killing yourself?: No (8/3/2022 10:13 AM)  6. Have you ever done anything, started to do anything, or prepared to do anything to end your life?: No (8/3/2022 10:13 AM)          Assessment:   LCSW met with pt for individual session as part of PHP. Pt discussed her experience in PHP from Friday as \"excellent\" and shared about utilizing self-compassion over the weekend. Pt discussed the discord in her marital relationship, sharing about an argument over the weekend. Pt denied any substance use over the weekend. LCSW provided supportive listening and explored pt's motivations to focus on her mental health and sobriety. Pt reported utilizing her social supports of her family as well as affirmations over the weekend. LCSW and pt discussed ways to increase emotional " safety in the relationship to be able to continue to focus on her MH and use of skills while in the marriage. Reviewed recommendation for dual dx IOP for stepdown, and pt had not yet contacted OP therapists - LCSW reiterated importance of connecting to longer-term supports. Pt inquired about OP groups through [facility] which LCSW will bring to treatment team.    Plan:    Patient to F/U with PHP.  Patient to F/U with treatment goals as outlined in treatment plan.  Patient to F/U with local ED or call 911 should SI/HI arise.  Visit Diagnosis:    ICD-10-CM ICD-9-CM   1. Bipolar II disorder (CMS/HCC)  F31.81 296.89   2. Alcohol use disorder, moderate, dependence (CMS/HCC)  F10.20 303.90   3. Moderate benzodiazepine use disorder (CMS/HCC)  F13.20 304.10   4. Cannabis use disorder, mild, abuse  F12.10 305.20   5. Mixed obsessional thoughts and acts  F42.2 300.3       Time  Start Time: 0838  End Time: 0920  Total Time: 42  Radha Carpenter LCSW @ 10:46 AM

## 2022-08-08 NOTE — PROGRESS NOTES
PHP PSYCHIATRY FOLLOW UP/MEDICATION CHECK    Renate Wallace is a 31 y.o. female who presents for Follow-up and Med Management.    HPI  Started quetiapine.  Was able to sleep mostly through the night.  Felt her anxiety was less when she woke.  Restarted buspar and lamotrigine over the weekend with no adverse effects.  Reports poor interactions with  over the weekend.  However, feels she was able to maintain self care and interactions with her children.  Denies substance use through the weekend.  Focused on goal of improving her mental health care.  Denies intrusive thoughts.  Denies SI/HI.    Psychiatric ROS:   Sleep:Good- better  Appetite: Fair, avg two meals the past five days.  Exercise: deferred  ETOH/Substances: denies, denies cravings  SI/HI: denies, future oriented, feels safe at home.      Medical Review of Systems  Review of Systems   Constitutional: Negative for fatigue.   Gastrointestinal: Negative for nausea.   Skin: Negative for rash.   Neurological: Negative for dizziness, tremors and headaches.       PMSH: No changes were made to non-psychiatric medications/allergies/medical history.     Risk/Benefit/side Effects discussed regarding the following medications:  buspirone  PDMP Queried: Yes   No new fills of alprazolam    Allergies: No Known Allergies    Current Outpatient Medications:   •  busPIRone (BUSPAR) 5 mg tablet, Take 1 tablet (5 mg total) by mouth 2 (two) times a day for 15 days., , Disp: 30 tablet, Rfl: 0  •  lamoTRIgine (LaMICtal) 25 mg tablet, Take 1 tablet (25 mg total) by mouth daily., , Disp: 30 tablet, Rfl: 0  •  QUEtiapine (SEROqueL) 50 mg tablet, Take 1 tablet (50 mg total) by mouth nightly for 15 days. Take one-half tablet for the first night, , Disp: 15 tablet, Rfl: 0  •  VYVANSE 60 mg capsule, Take 60 mg by mouth once daily., , Disp: , Rfl:        Objective     Physical Exam    There were no vitals taken for this visit.    Mental Status Exam  Appearance: well groomed and  appropriate attire  Gait and Motor: no abnormal movements  Speech: normal rate/rhythm/volume  Mood: depressed and anxious  Affect: normal  Associations: coherent  Thought Process: goal-directed  Thought Content: no auditory or visual hallucinations. and appropriate to situation  Suicidality/Homicidality: denies  Judgement/Insight: fair insight and judgement  Orientation: person, place, time and situation  Attention: alert  Language: normal       PHQ-9: Total Score-OWL Transcribed: 15  Thoughts that You Would be Better Off Dead or of Hurting Yourself in Some Way: 0-->not at all  MONICA-7 Total Score-OWL Transcribed: 18    [place] Suicide Severity Rating Scale:  Completed 8/3/22  C-SSRS Short Version Recent  1. Within the past month, have you wished you were dead or wished you could go to sleep and not wake up?: No (8/3/2022 10:13 AM)  2. Within the past month, have you actually had any thoughts of killing yourself?: No (8/3/2022 10:13 AM)  6. Have you ever done anything, started to do anything, or prepared to do anything to end your life?: No (8/3/2022 10:13 AM)      Safe-T Assessment:  Not indicated        Labs  8/3/2022  UDS positive for stimulants     7/2022  CBC WNL  CMP no significant abnormals  TSH WNL     7/30/22 (from ED visit)  CMP no significant abnormals, LFTs WNL  UDS positive for cannabis and amphetamines/methamphetamines  CBC WNL  Ethanol level 295      ECG   None in Epic         Brief Psychiatric Formulation: Renate is a 31 year old female with a reported history of bipolar disorder and OCD as well as alcohol, benzodiazepine, and cannabis use disorders presenting for [facility] admission after initial evaluation by [facility].  She reports recent precipitating factors for both her mood deterioration and substance use of work stressors and isolation during the COVID pandemic, as well as poor relationship with her .     She has tolerated restart of her previous regimen and addition of quetiapine.  Advised that a  UDS will be ordered for tomorrow.  Discussed potential to further adjust buspirone and addition of naltrexone.         Based on [place] Suicide Screen and current clinical assessment, patient is determined Low Risk.  Suicide Risk/Suicidal Ideation will not be added to patient's treatment plan.     Plan:    · Admit to PHP 8/5/2022  · Weekly weights and UDS, to complete UDS tomorrow  · Continue lamotrigine 25 mg daily  · Continue buspirone 5 mg BID  · Continue quetiapine 50 mg QHS for depression, anxiety, impulsivity, and sleep  · Encouraged to hold Vyvanse  · T/c naltrexone start  · Aftercare planning: Dual diagnosis IOP, individual therapy, return to OP psychiatry  · Patient to F/U with treatment goals as outlined in treatment plan.  · Patient to F/U with local ED or call 911 should SI/HI arise.    Recertification: I certify that PHP level of care continues to be required, improvement is expected and without this medically necessary treatment, inpatient psychiatric care would be required.  Renate Wallace's response to therapeutic intervention has shown gradual improvement. Renate Wallace remains at risk for psychiatric hospitalization due to moderate to severe mood symptoms. Treatment goals and coordination of care with multidisciplinary team as outlined in updated treatment plan      Visit Diagnosis:    ICD-10-CM ICD-9-CM   1. Bipolar II disorder (CMS/HCC)  F31.81 296.89   2. Alcohol use disorder, moderate, dependence (CMS/HCC)  F10.20 303.90   3. Moderate benzodiazepine use disorder (CMS/HCC)  F13.20 304.10   4. Cannabis use disorder, mild, abuse  F12.10 305.20   5. Obsessive-compulsive disorder, unspecified type  F42.9 300.3       Duration:  15 minutes  CATRACHITA Jackson @ 8:54 PM

## 2022-08-08 NOTE — PROGRESS NOTES
[facility] reply: Is accepting new pts, hybrid sessions depends on therapist , next week, Medicare, [employer], [employer], [employer], [employer]- depends on clinician & refer admin at [email or web address]    SW email REF:    Valdemar Dewitt;    Hope you are well.  I’m a  with [facility].  Niru ask that I send you referrals for outpatient providers-therapists with addiction experience.      Below is information on therapists and counseling practices in the area and that possibly work with the insurance we have on file for you.  The last I communicated with below providers they had availability but it is busy time in mental health care, so you may hear they are not accepting new patients or offering wait list.  I advise to leave a message, wait few days to hear back and if no reply call again.  You may want to get on a few wait lists and use online contact portals or emails to get quicker replies.     [facility], prefers email to set up new patient: [email or web address], [phone] & website: [email or web address]  Location [place], is accepting new clients offers in person or virtual depending on therapist  and works with [employer] which is [facility] behavioral health insurance.      [employer], [employer], website: [email or web address], Dr. Maik Esqueda: [phone], Terrie Esqueda: [phone], Jet Rajan: [phone] & Deepika Spring: [phone]. Located in [place], is accepting new patients and woks with [facility].  Mainly virtual apts.    [facility] (owner Brendan Alstonciara) Owner Brendan phone: [phone] or Office: [phone] & website: [email or web address]  Offers individual and groups for Anxiety, offering in person and virtual, located in [place].  Accepts most insurances and accepting new patients.      [facility] Phone [phone], website: [email or web address]  Located in [place], offers in person & virtual appointments and works with [facility].    Plus many employers offers Employee Assistance Program (EAP), they can help find you a counselor and may offer some sessions at no cost.   Typically, spouses, parents and children are eligible for EAP services.  If you or your spouse works, I would advise to ask them about their EAP vendor, this information can typically be found on the employer’s benefits website/portal or ask your Human Resources department.      Please let me know that you received this email and when you have an appointment schedule or if more help is needed.    Take Care-

## 2022-08-08 NOTE — GROUP NOTE
Date:  8/8/2022  Start Time:  12:10 PM  End Time:   1:10 PM  Renate Wallace, YOB: 1990  Psychoeducational/Skills Based Group  8 members attended group today    Group Focus: Goal of group was to introduce skills and psychoeducation related to topic of PHP day.   Group members were provided instruction and opportunities to practice presented skills.  Clinician answered questions as they arose and shared how presented skills can be utilized on a daily basis to increase emotional wellness.      About the Group: Mindfulness Topic of curriculum was Mindfulness.” Clinician further introduced the concept of mindfulness by showing group members a Ángel Rob video. Clinician educated group members on the many different ways to use mindfulness day to day, moment to moment by sharing a Skills Overview worksheets. Concepts described on the worksheets included: 1) observe, 2) describe, 3) participate, 4) non-judgmental stance, 5) one-mindfully, 6) effectively, 7) wise mind (including a video description). The goal of sharing the skills was to provide multiple techniques for practicing mindfulness in our lives. Clinician then encouraged group members to complete a Wise Mind worksheet to relate their own experience to the concept of Wise Mind. Clinician facilitated a group discussion to check for understanding of presented skills. Clinician ended group by leading group members through a “5 Senses” exercises to help group members link practice of Observe, Describe and One-Mindfully skills.    Jacobo Benitez DO was onsite when services were rendered.          Patient  was an active group participant.  Assessment/observation of group participation/presentation: Renate participated in the experiential exercises, was observed to be taking notes during group, and shared about a talk she listened to about mindfulness.  Treatment plan areas addressed: Depression, Anxiety, Mood dysregulation, Coping Skills and  Mindfulness  Visit Diagnosis:      ICD-10-CM ICD-9-CM   1. Bipolar II disorder (CMS/HCC)  F31.81 296.89   2. Alcohol use disorder, moderate, dependence (CMS/HCC)  F10.20 303.90   3. Moderate benzodiazepine use disorder (CMS/HCC)  F13.20 304.10   4. Cannabis use disorder, mild, abuse  F12.10 305.20   5. Mixed obsessional thoughts and acts  F42.2 300.3       Plan: Continue with PHP   Pt to F/U with treatment goals as outlined in treatment plan.  Pt to F/U with local ED/call 911 should SI/HI arises.    Radha Carpenter, MEGHAN

## 2022-08-09 ENCOUNTER — NURSE ONLY (OUTPATIENT)
Dept: PSYCHIATRY | Facility: HOSPITAL | Age: 32
End: 2022-08-09
Payer: COMMERCIAL

## 2022-08-09 ENCOUNTER — APPOINTMENT (OUTPATIENT)
Dept: LAB | Facility: CLINIC | Age: 32
End: 2022-08-09
Attending: NURSE PRACTITIONER
Payer: COMMERCIAL

## 2022-08-09 ENCOUNTER — PHP (OUTPATIENT)
Dept: PSYCHIATRY | Facility: HOSPITAL | Age: 32
End: 2022-08-09
Attending: NURSE PRACTITIONER
Payer: COMMERCIAL

## 2022-08-09 VITALS — SYSTOLIC BLOOD PRESSURE: 135 MMHG | DIASTOLIC BLOOD PRESSURE: 93 MMHG | HEART RATE: 88 BPM

## 2022-08-09 DIAGNOSIS — F12.10 CANNABIS USE DISORDER, MILD, ABUSE: ICD-10-CM

## 2022-08-09 DIAGNOSIS — F31.81 BIPOLAR II DISORDER (CMS/HCC): Primary | ICD-10-CM

## 2022-08-09 DIAGNOSIS — F10.20 ALCOHOL USE DISORDER, MODERATE, DEPENDENCE (CMS/HCC): ICD-10-CM

## 2022-08-09 DIAGNOSIS — F42.9 OBSESSIVE-COMPULSIVE DISORDER, UNSPECIFIED TYPE: ICD-10-CM

## 2022-08-09 DIAGNOSIS — F13.20 MODERATE BENZODIAZEPINE USE DISORDER (CMS/HCC): ICD-10-CM

## 2022-08-09 PROCEDURE — G0410 GRP PSYCH PARTIAL HOSP 45-50: HCPCS | Performed by: COUNSELOR

## 2022-08-09 PROCEDURE — H0035 MH PARTIAL HOSP TX UNDER 24H: HCPCS | Performed by: COUNSELOR

## 2022-08-09 PROCEDURE — 80307 DRUG TEST PRSMV CHEM ANLYZR: CPT | Performed by: NURSE PRACTITIONER

## 2022-08-09 PROCEDURE — G0410 GRP PSYCH PARTIAL HOSP 45-50: HCPCS

## 2022-08-09 ASSESSMENT — PAIN SCALES - GENERAL: PAINLEVEL: 0-NO PAIN

## 2022-08-09 NOTE — PROGRESS NOTES
LCSW provided pt with [facility] malin for IOP and encouraged pt to call to inquire about availability/intake for PHP stepdown. As well as encouraged pt to start OP therapy outreach for individual tx.

## 2022-08-09 NOTE — PROGRESS NOTES
BP continues to be elevated.  Offered to re-check later in day.  Will do manual BP at that time.    Vitals:    08/09/22 0933   BP: (!) 129/98   Pulse: 96

## 2022-08-09 NOTE — GROUP NOTE
Date:  8/9/2022  Start Time:  12:10 PM  End Time:   1:10 PM  Renate Wallace, YOB: 1990  Psychoeducational/Skills Based Group  8 members attended group today    Group Focus: Goal of group was to introduce skills and psychoeducation related to topic of PHP day.   Group members were provided instruction and opportunities to practice presented skills.  Clinician answered questions as they arose and shared how presented skills can be utilized on a daily basis to increase emotional wellness.      About the Group: Emotion Regulation Session 2: Emotion Regulation Psycho-Ed/Skills Based Group Summary   Topic of curriculum was “Emotion Regulation”. Clinician began group discussion by educating group members of the importance of having coping skills available to use at any time whether for maintaining emotional wellness of responding to a stimulating event. Clinician shared that the goal of the group is to provide coping skills to group members for ongoing emotional wellness as well as crisis intervention when intense emotions are triggered/activated. Clinician provided the first set of proactive skills to reduce emotional vulnerability which included “P.L.E.A.S.E.” and “Paying Attention to Positive Events”. The next set of responsive coping skills, used  when a surge of emotion comes, negative event happens, etc, included 1) naming the emotion, 2) opposite action, 3) check the facts, 4) feeling not acting, and 5) ride the wave. Clinician provided group members with worksheets to reinforce the skills. While completing the P.L.E.A.S.E worksheet, Clinician also discussed hormonal vulnerability as women and barriers to healthy sleep and provided resources on bedtime routine/healthy sleep hygiene. Clinician continued the topic discussion and checked for understanding of presented skills. Clinician ended the group with a sleep meditation led by Devon Marquez.     Kathrine Duong MD was onsite when services were  rendered.          Patient  was an active group participant.  Assessment/observation of group participation/presentation: Pt was intrigued by the content covered on emotional health and regulation. Pt was able to connect with the DBT skill PLEASE, noting that giving birth to her child in October in addition to surviving quarantine living during [facility] had really placed a toll on her ADLs including her sleep hygiene and healthy eating habits. Pt was interested in learning about primary and secondary emotions and had not been familiar with the tertiary feelings. Pt enjoyed the riding the wave of emotions analogy the LPC discussed and how feelings only last physiologically for 90 seconds before dissipating. Pt wants to tap more into physical means as to how she can release feelings of pent up frustration and anger, namely as it relates to her job stresses.       Treatment plan areas addressed: Depression, Anxiety, Mood dysregulation, Coping Skills, Mindfulness, Self-compassion and Daily functioning  Visit Diagnosis:      ICD-10-CM ICD-9-CM   1. Bipolar II disorder (CMS/HCC)  F31.81 296.89   2. Alcohol use disorder, moderate, dependence (CMS/HCC)  F10.20 303.90   3. Moderate benzodiazepine use disorder (CMS/HCC)  F13.20 304.10   4. Cannabis use disorder, mild, abuse  F12.10 305.20   5. Obsessive-compulsive disorder, unspecified type  F42.9 300.3       Plan: Continue with PHP   Pt to F/U with treatment goals as outlined in treatment plan.  Pt to F/U with local ED/call 911 should SI/HI arises.    Karyn Albarran, LPC

## 2022-08-09 NOTE — GROUP NOTE
Date: 8/9/2022  Start Time:   9:30 AM  End Time: 10:30 AM    Renate Wallace, YOB: 1990,  was an active group participant.    Community Check In Group  8 attended group today.     Group Focus: Goal of group was to welcome new and returning PHP members to the [facility], check in regarding group member needs, and assess safety.    About the Group: Clinician reviewed [facility] Group Code of Conduct and answered any questions that arose.  Clinician welcomed new members to the group and facilitated brief introductions of group members to one another.  Introduced topic/theme for the treatment day:Emotion Regulation.  Encouraged group members to request support throughout the day as needed.  Clinician reviewed group members’ Morning Reflection Sheets and did a verbal check in with each group member regarding safety (SI/HI/self harm), safety planning, medication compliance, if they needed to consult with anyone today and individual treatment needs/goals for the day.  Session ended with a brief meditation/calming activity.   Kathrine Duong MD was on site when services rendered.      Morning Reflection:   Depression:  0/5  Anxiety:  2/5  Anger:  0/5  Thoughts of taking one's own life today?:  0/5 - None  Self Injury:  0/5 - None  Engaged in Self Injury since yesterday: No    Thoughts of hurting other people today?:  0/5 - None  Compulsive Behaviors?:  0-None  Compulsive Behaviors?:  No  Are you able to follow your safety plan?: Yes      I can/will:  Use grounding with my 5 senses, Use a coping skill, Call someone, Journal and Listen to music  Substance Use: No    Self Care: Yes      I completed my self care activity last night::  Went shopping    I am satisfied with my daily hygiene: Yes    Nourish:  Yes    Number of meals eaten yesterday:  2    Describe:  Normal and Nutritious  Sleep:  Yes    Used sleep aid?: Yes      Describe sleep:  Restful    Number of hours:  7  Social Interaction:  Yes    Social Interaction:   "Moderate and Minimal    Moderate with:  Family    Minimal with:  Friends  Physical Activity:  Yes    Describe Practice: walked 30 min  Mindful Activity:  Yes    Describe Practice:  Practiced mindfulness during walk  Medication:  Yes    Medication:  Prescribed  Thought Content:  Clear and Racing Thoughts  Which coping skills did you use yesterday? How did they help or not help?  What barriers did you face?:  I used the 478 breathing, no barriers, yes - it helped!!  Pt reported significant or positive event/step:  I replied to a friend who has been worried and explained what's been going on  Pt identified goal for the treatment day:  Feeling overwhelmed which leads to anxiety and frustration  Pt treatment plan areas addressed:  Depression, Anxiety, Self care, Mood dysregulation, Coping skills and Mindfulness  Pt requested consult with:  Therapist      Visit Diagnosis:      ICD-10-CM ICD-9-CM   1. Bipolar II disorder (CMS/HCC)  F31.81 296.89   2. Alcohol use disorder, moderate, dependence (CMS/HCC)  F10.20 303.90   3. Moderate benzodiazepine use disorder (CMS/HCC)  F13.20 304.10   4. Cannabis use disorder, mild, abuse  F12.10 305.20   5. Obsessive-compulsive disorder, unspecified type  F42.9 300.3       Assessment/Observations:  Pt inquired individually with LCSW about adding her mom as CC - LCSW explained obtaining DEBI at . Pt shared that she is starting to feel improved mood, which makes her anxious and have thoughts like \"I shouldn't feel this way\" and worries that it could be the start of estefani, though pt reports no signs of estefani. Pt has not made progress in calling OP providers for IT, and requested support from the group in reminding her to call on the break. LCSW also reminded pt that LSW is a resource and encouraged pt to start outreach ASAP.  Plan:  Continue with PHP   Pt to F/U with treatment goals as outlined in treatment plan.  Pt to F/U with local ED/call 911 should SI/HI arises.    Radha Barragan" [facility] HospitalJOSE DAVID

## 2022-08-09 NOTE — GROUP NOTE
Date:  8/9/2022  Start Time:  10:40 AM  End Time:  11:40 AM  Renate Wallace, YOB: 1990  8 members attended group today.    Group Focus:  Goal of group was to establish and build social support, review coping skills, normalize the struggles of being human, and increase self awareness and self compassion.    About the Group:  Clinician started group with a prompting quote/song to facilitate group discussion.  Group engaged in active and supportive discussion. Topics discussed included balancing own needs as a caregiver, responding to triggers, increasing sense of safety and agency.  Group members were able to offer insightful and supportive feedback and suggestions about how to manage difficult situations.  Group ended with a meditation/song.  Kathrine Duong MD was onsite when services were rendered.        Patient  was an active group participant.  Assessment/observation of group participation/presentation: Pt offered supportive feedback and asked clarifying questions of her peers. She became tearful on a pt's share and seems to be connecting strongly to group.  Treatment plan areas addressed: Depression, Anxiety, Relationship issues/Communication skills, Mood dysregulation and Coping Skills  Visit Diagnosis:      ICD-10-CM ICD-9-CM   1. Bipolar II disorder (CMS/HCC)  F31.81 296.89   2. Alcohol use disorder, moderate, dependence (CMS/HCC)  F10.20 303.90   3. Moderate benzodiazepine use disorder (CMS/HCC)  F13.20 304.10   4. Cannabis use disorder, mild, abuse  F12.10 305.20   5. Obsessive-compulsive disorder, unspecified type  F42.9 300.3       Plan: Continue with PHP   Pt to F/U with treatment goals as outlined in treatment plan.  Pt to F/U with local ED/call 911 should SI/HI arises.    Radha Carpenter LCSW

## 2022-08-09 NOTE — GROUP NOTE
Date: 8/9/2022  Start Time:  1:20 PM  End Time:   2:20 PM  Renate Wallace, YOB: 1990,  was an active group participant.    Wrap Up Group  8  attended group today.   Group Focus: Goal of group was to wrap up and process the treatment day.  Assist  members in planning for the evening ahead and to assess and plan surrounding any  safety needs.      About the Group:  Clinician reviewed group members Afternoon  Reflection Sheets and did a verbal check in with each group member regarding safety  (SI/HI/self harm) and any necessary safety planning .  Session ended with a brief  meditation/calming activity.  Kathrine Duong MD was onsite when  services rendered.        Afternoon Reflection:   Depression:  0/5  Anxiety:  1/5  Anger:  0/5  Thoughts of taking one's own life today?:  0/5 - None  Self Injury:  0/5 - None  Engaged in self-injury?  No  Thoughts of hurting other people today?:  0/5 - None  Compulsive Behaviors?:  0-None  Compulsive Behaviors?: No  Are you able to follow our safety plan?: Yes      I can/will:  Call someone, Journal, Use a coping skill, Listen to music and Use grounding with my 5 senses  The most significant moment of the day or insight for me was...:  Biggest takeaway was the insight needs to be coupled with action and the powerfulness of the PLEASE DBT skill. Pt will further consider these tools.   Three things I am grateful for today are:  1) Level of trust in the PHP program; 2) Sense of resilience and getting back to herself; 3) Quality of sleep   Did you meet your goal for today? If not, what might you do tomorrow or this week to achieve it?:  Yes  My evening self-care plan is:  Create a mental health toolkit that I can use and begin making necessary phone calls or at least commit to making them tomorrow.       Pt treatment plan areas addressed: Depression, Anxiety, Relationship issues/Communication skills, Employment/vocational stress, Mood dysregulation, Parenting Stress,  Coping Skills, Mindfulness, CBT skills, Building strengths/resilience, Self-compassion and Daily functioning    Visit Diagnosis:      ICD-10-CM ICD-9-CM   1. Bipolar II disorder (CMS/HCC)  F31.81 296.89   2. Alcohol use disorder, moderate, dependence (CMS/HCC)  F10.20 303.90   3. Moderate benzodiazepine use disorder (CMS/HCC)  F13.20 304.10   4. Cannabis use disorder, mild, abuse  F12.10 305.20   5. Obsessive-compulsive disorder, unspecified type  F42.9 300.3       Assessment/Observations:  Pt was very vocal and interested in today's topic of emotional regulation. Pt disclosed her various mental health challenges stemming from COVID and the way the world changed during her pregnancy in addition to work related stressors and how she wants to create more healthy boundaries in addition to channeling her anger and resentment in a healthier and productive means instead of bottling it up and displacing it all inward.     Plan: Continue with PHP   Pt to F/U with treatment goals as outlined in treatment plan.  Pt to F/U with local ED/call 911 should SI/HI arises.    Karyn Albarran, LPC

## 2022-08-10 ENCOUNTER — PHP (OUTPATIENT)
Dept: PSYCHIATRY | Facility: HOSPITAL | Age: 32
End: 2022-08-10
Attending: NURSE PRACTITIONER
Payer: COMMERCIAL

## 2022-08-10 ENCOUNTER — TELEPHONE (OUTPATIENT)
Dept: PSYCHIATRY | Facility: HOSPITAL | Age: 32
End: 2022-08-10
Payer: COMMERCIAL

## 2022-08-10 ENCOUNTER — NURSE ONLY (OUTPATIENT)
Dept: PSYCHIATRY | Facility: HOSPITAL | Age: 32
End: 2022-08-10
Payer: COMMERCIAL

## 2022-08-10 ENCOUNTER — TELEPHONE (OUTPATIENT)
Dept: PSYCHIATRY | Facility: HOSPITAL | Age: 32
End: 2022-08-10

## 2022-08-10 DIAGNOSIS — F42.9 OBSESSIVE-COMPULSIVE DISORDER, UNSPECIFIED TYPE: ICD-10-CM

## 2022-08-10 DIAGNOSIS — F13.20 MODERATE BENZODIAZEPINE USE DISORDER (CMS/HCC): ICD-10-CM

## 2022-08-10 DIAGNOSIS — F12.10 CANNABIS USE DISORDER, MILD, ABUSE: ICD-10-CM

## 2022-08-10 DIAGNOSIS — F10.20 ALCOHOL USE DISORDER, MODERATE, DEPENDENCE (CMS/HCC): ICD-10-CM

## 2022-08-10 DIAGNOSIS — F31.81 BIPOLAR II DISORDER (CMS/HCC): Primary | ICD-10-CM

## 2022-08-10 PROCEDURE — H0035 MH PARTIAL HOSP TX UNDER 24H: HCPCS | Performed by: COUNSELOR

## 2022-08-10 NOTE — PROGRESS NOTES
Met with Renate closer to the end of PHP day.  Renate wanted advice on surgical intervention of mass on thyroid.  Encouraged Renate to discuss with her PHP to make most sound decision that is right for her.      Discussed with Renate earlier concern of what she thought was a seizure.  Described to Renate what s/s go with seizure as with low blood sugar.  Renate agreed that the event was most likely not a seizure as she was able to identify that confusion, clammy hands and sweating/feeling hot were related more so to hypoglycemic event which she acknowledges also occurred.     Renate had no other questions concerns or needs at this time.

## 2022-08-10 NOTE — GROUP NOTE
Date:  8/10/2022  Start Time:  12:10 PM  End Time:   1:10 PM  Renate Wallace, YOB: 1990  Psychoeducational/Skills Based Group  8 members attended group today    Group Focus: Goal of group was to introduce skills and psychoeducation related to topic of PHP day.   Group members were provided instruction and opportunities to practice presented skills.  Clinician answered questions as they arose and shared how presented skills can be utilized on a daily basis to increase emotional wellness.      About the Group: Trauma (day 1) Session 3: Trauma (Day 1) Psycho-Ed/Skills Based Group Summary  Topic of curriculum was “Trauma”. Clinician began group by briefly reviewing description of trauma from group 1. Clinician provided psychoeducation about the body’s response to trauma using a Fight or Flight handout. Clinician introduced two techniques used in the context of trauma to help a person to stay in the present moment and cope with triggers. The first technique is called “Grounding” and was described using a worksheet from [employer]. The second is a technique called “EFT tapping” and was illustrated using a YouTube video and paired with a worksheet on tapping points. Clinician then led group members through an open discussion on their experience with the two techniques and usefulness in managing triggers. Clinician ended group with a grounding meditation from [employer].    Kathrine Duong MD was onsite when services were rendered.          Patient  was an active group participant.  Assessment/observation of group participation/presentation: Pt was engaged in group session. Pt was willing to engage in experiential techniques to help with grounding. Pt related with peers about compartmentalizing stress and trauma without processing and healing from it. Pt shared that she allowed things to build up to the point where she could not function with daily tasks such as cooking a meal. Pt seemed interested in  psychoeducation and was taking notes when LPC was talking about ANS and managing stress response. Pt was able to use life example from interactions with her daughter last night, and how using 5 sense grounding helped her to regulate as well as helped her daughter to regulate.   Treatment plan areas addressed: Depression, Anxiety, Relationship issues/Communication skills, Self Care, Mood dysregulation, Parenting Stress, Coping Skills, Mindfulness, CBT skills, Building strengths/resilience, Self-compassion and Other: SUDS   Visit Diagnosis:      ICD-10-CM ICD-9-CM   1. Bipolar II disorder (CMS/HCC)  F31.81 296.89   2. Alcohol use disorder, moderate, dependence (CMS/HCC)  F10.20 303.90   3. Moderate benzodiazepine use disorder (CMS/HCC)  F13.20 304.10   4. Cannabis use disorder, mild, abuse  F12.10 305.20   5. Obsessive-compulsive disorder, unspecified type  F42.9 300.3       Plan: Continue with PHP   Pt to F/U with treatment goals as outlined in treatment plan.  Pt to F/U with local ED/call 911 should SI/HI arises.    Edward Garcia LPC

## 2022-08-10 NOTE — TELEPHONE ENCOUNTER
Pt’s sister Kaila called back, LCSW reviewed recommendation to obtain information about dual dx/SUDS IOP through [facility] and LCSW also provided [facility] contact info. Kaila agrees to encourage pt to connect with aftercare resources.

## 2022-08-10 NOTE — GROUP NOTE
"Date:  8/10/2022  Start Time:  10:40 AM  End Time:  11:40 AM  Renate Wallace, YOB: 1990  8 members attended group today.    Group Focus:  Goal of group was to establish and build social support, review coping skills, normalize the struggles of being human, and increase self awareness and self compassion.    About the Group:  Clinician started group with a prompting quote/song to facilitate group discussion.  Group engaged in active and supportive discussion. Topics discussed included trauma's impact on the brain, cognitive distortions, managing ambiguity/anxiety.  Group members were able to offer insightful and supportive feedback and suggestions about how to manage difficult situations.  Group ended with a meditation/song.  Kathrine Duong MD was onsite when services were rendered.        Patient  was an active group participant.  Assessment/observation of group participation/presentation: Renate discussed getting a \"not great\" result on her thyroid test. She was tearful in discussing the anxiety this is provoking. She seems to have significant self-blame around this and to have a distortion that she caused this to happen by not tending to her health, by not coping with stress. LCSW and the group offered support and reframes. Pt reported that only some of the reframes were helpful to her, but stated that talking it through helped her to feel less anxious. She looked to others in the group for reassurance about her thyroid, sharing that she feels sensations as if it is getting larger.  Treatment plan areas addressed: Depression, Anxiety, Low Self Esteem, Grief and Loss, Chronic Pain/Medical Issues, Self Care, Mood dysregulation, Phase of life problems, Trauma, Coping Skills and CBT skills  Visit Diagnosis:      ICD-10-CM ICD-9-CM   1. Bipolar II disorder (CMS/HCC)  F31.81 296.89   2. Alcohol use disorder, moderate, dependence (CMS/HCC)  F10.20 303.90   3. Moderate benzodiazepine use disorder " (CMS/[facility])  F13.20 304.10   4. Cannabis use disorder, mild, abuse  F12.10 305.20   5. Obsessive-compulsive disorder, unspecified type  F42.9 300.3       Plan: Continue with PHP   Pt to F/U with treatment goals as outlined in treatment plan.  Pt to F/U with local ED/call 911 should SI/HI arises.    Radha Carpenter LCSW

## 2022-08-10 NOTE — GROUP NOTE
Date: 8/10/2022  Start Time:  1:20 PM  End Time:   2:20 PM  Renate Wallace, YOB: 1990,  was an active group participant.    Wrap Up Group  8 group members attended group today.   Group Focus: Goal of group was to wrap up and process the treatment day.  Assist  members in planning for the evening ahead and to assess and plan surrounding any  safety needs.      About the Group:  Clinician reviewed group members Afternoon  Reflection Sheets and did a verbal check in with each group member regarding safety  (SI/HI/self harm) and any necessary safety planning .  Session ended with a brief  meditation/calming activity.  Kathrine Duong MD was onsite when  services rendered.        Afternoon Reflection:   Depression:  0/5  Anxiety:  1/5  Anger:  0/5  Thoughts of taking one's own life today?:  0/5 - None  Self Injury:  0/5 - None  Engaged in self-injury?  No  Thoughts of hurting other people today?:  0/5 - None  Compulsive Behaviors?:  0-None  Compulsive Behaviors?: No  Are you able to follow our safety plan?: Yes      I can/will:  Journal, Call someone, Listen to music and Use grounding with my 5 senses  The most significant moment of the day or insight for me was...:  Realizing that I do not need to create additional and unnecessary suffering for myself. I do not have to let the pot boil over and can let the heat out a little bit at a time.   Three things I am grateful for today are:  1) Therapy (PHP group); 2) Family; and 3) My Sister   Did you meet your goal for today? If not, what might you do tomorrow or this week to achieve it?:  Yes and I thank the group for helping me to process everything.   My evening self-care plan is:  Do guided meditation; visualization exercise and eft tapping to relieve anxiety.       Pt treatment plan areas addressed: Depression, Anxiety, Trauma, Coping Skills, Mindfulness and Building strengths/resilience    Visit Diagnosis:      ICD-10-CM ICD-9-CM   1. Bipolar II disorder  (CMS/[facility])  F31.81 296.89   2. Alcohol use disorder, moderate, dependence (CMS/HCC)  F10.20 303.90   3. Moderate benzodiazepine use disorder (CMS/[facility])  F13.20 304.10   4. Cannabis use disorder, mild, abuse  F12.10 305.20   5. Obsessive-compulsive disorder, unspecified type  F42.9 300.3       Assessment/Observations:  Pt stated how much calmer she felt this afternoon in comparison with earlier this morning. Pt was feeling distraught about health concerns and thanked the group for support and empathy as she processed these difficult emotions. Pt plans on utilizing a visualization skill this evening and she would like to practice more EFT Tapping.   Plan: Continue with PHP   Pt to F/U with treatment goals as outlined in treatment plan.  Pt to F/U with local ED/call 911 should SI/HI arises.    Karyn Albarran, LPC

## 2022-08-10 NOTE — TELEPHONE ENCOUNTER
LCSW called pt’s sister Kaila Rodríguez [phone], left  requesting Kaila encourage [facility] to contact [facility] re: IOP to inquire about availability and potentially schedule an intake, as tomorrow is pt’s long term point in PHP and she has not yet set up aftercare .Recommendation is for dual dx or SUDS evening IOP.

## 2022-08-10 NOTE — PROGRESS NOTES
"Renate approached RN in morning with concerns over a medical event that occurred last evening.  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  She states she had eaten popcorn and some doritos earlier.  Then later when  arrived back home she felt strange, shaky and room was getting dark and braced herself along with husbands help.  She denies loss of consciousness but describes that event felt as though she was passing out.  She then ate salad and heavy carbs and began to feel better.  Informed Renate that this was more likely a hypoglycemic event and that we could further discuss during her break later this morning.  She wanted to know the likelihood of this being a seizure.  Encouraged her to come see RN over break to discuss further that this event was more likely low blood sugar and not seizure.  Waiting to see Renate to further discuss and educate.  "

## 2022-08-10 NOTE — GROUP NOTE
Date: 8/10/2022  Start Time:   9:30 AM  End Time: 10:30 AM    Renate Wallace, YOB: 1990,  was an active group participant.    Community Check In Group  8 attended group today.     Group Focus: Goal of group was to welcome new and returning PHP members to the [facility], check in regarding group member needs, and assess safety.    About the Group: Clinician reviewed [facility] Group Code of Conduct and answered any questions that arose.  Clinician welcomed new members to the group and facilitated brief introductions of group members to one another.  Introduced topic/theme for the treatment day:Trauma.  Encouraged group members to request support throughout the day as needed.  Clinician reviewed group members’ Morning Reflection Sheets and did a verbal check in with each group member regarding safety (SI/HI/self harm), safety planning, medication compliance, if they needed to consult with anyone today and individual treatment needs/goals for the day.  Session ended with a brief meditation/calming activity.   Kathrine Duong MD was on site when services rendered.      Morning Reflection:   Depression:  0/5  Anxiety:  4/5  Anger:  2/5  Thoughts of taking one's own life today?:  0/5 - None  Self Injury:  0/5 - None  Engaged in Self Injury since yesterday: No    Thoughts of hurting other people today?:  0/5 - None  Compulsive Behaviors?:  0-None  Compulsive Behaviors?:  No  Are you able to follow your safety plan?: Yes      I can/will:  Call someone, Use a coping skill, Use grounding with my 5 senses, Journal and Listen to music  Substance Use: No    Self Care: Yes      I completed my self care activity last night::  Allowed self to rest and eat comfort foods    I am satisfied with my daily hygiene: Yes    Nourish:  No    Number of meals eaten yesterday:  1    Describe:  Minimal (unintentional, spoke with nurse Henderson about it)  Sleep:  Yes    Used sleep aid?: Yes      Describe sleep:  Restful    Number of hours:   7-8  Social Interaction:  Yes    Social Interaction:  Moderate and Isolating    Moderate with:  Family    Isolating with:  Friends  Physical Activity:  Yes    Describe Practice: walked 30 min  Mindful Activity:  Yes    Describe Practice:  Walking  Medication:  Yes    Medication:  Prescribed  Thought Content:  Clear and Racing Thoughts  Which coping skills did you use yesterday? How did they help or not help?  What barriers did you face?:  478 breathing, calling a loved one when anxious  Pt reported significant or positive event/step:  Voicemail for therapist  Pt identified goal for the treatment day:  I just want to focus on staying present and controlling what I can in regards to my health  Pt treatment plan areas addressed:  Depression, Anxiety, Chronic Pain/Medical Issues, Self care, Mood dysregulation, Coping skills and Mindfulness  Pt requested consult with:  None      Visit Diagnosis:      ICD-10-CM ICD-9-CM   1. Bipolar II disorder (CMS/[facility])  F31.81 296.89   2. Alcohol use disorder, moderate, dependence (CMS/HCC)  F10.20 303.90   3. Moderate benzodiazepine use disorder (CMS/HCC)  F13.20 304.10   4. Cannabis use disorder, mild, abuse  F12.10 305.20   5. Obsessive-compulsive disorder, unspecified type  F42.9 300.3       Assessment/Observations:  LCSW reviewed with pt that the recommendation will be to complete IOP before being potentially assessed for [facility] groups. Pt reported has not yet called [facility] to inquire about IOP. Pt seemed hesitant about the commitment to IOP with starting job, LCSW encouraged pt to start making calls to determine if there is availability that will match her new work schedule, and to schedule an intake, to consider long-term gains of engaging in appropriate LOC. Pt was fidgeting and ID anxious d/t thyroid results. Pt engaging in self-blame bx and open to discussing in open process. Pt reported increased commitment to ensure she is eating enough through the day after feeling faint  yesterday after not eating lunch despite packing one.  Plan:  Continue with PHP   Pt to F/U with treatment goals as outlined in treatment plan.  Pt to F/U with local ED/call 911 should SI/HI arises.    DEYANIRA HuertaW

## 2022-08-11 ENCOUNTER — PHP (OUTPATIENT)
Dept: PSYCHIATRY | Facility: HOSPITAL | Age: 32
End: 2022-08-11
Attending: NURSE PRACTITIONER
Payer: COMMERCIAL

## 2022-08-11 ENCOUNTER — NURSE ONLY (OUTPATIENT)
Dept: PSYCHIATRY | Facility: HOSPITAL | Age: 32
End: 2022-08-11

## 2022-08-11 VITALS — HEART RATE: 64 BPM | DIASTOLIC BLOOD PRESSURE: 88 MMHG | SYSTOLIC BLOOD PRESSURE: 118 MMHG

## 2022-08-11 DIAGNOSIS — F42.9 OBSESSIVE-COMPULSIVE DISORDER, UNSPECIFIED TYPE: ICD-10-CM

## 2022-08-11 DIAGNOSIS — F31.81 BIPOLAR II DISORDER (CMS/HCC): Primary | ICD-10-CM

## 2022-08-11 DIAGNOSIS — F13.20 MODERATE BENZODIAZEPINE USE DISORDER (CMS/HCC): ICD-10-CM

## 2022-08-11 DIAGNOSIS — F10.20 ALCOHOL USE DISORDER, MODERATE, DEPENDENCE (CMS/HCC): ICD-10-CM

## 2022-08-11 DIAGNOSIS — F12.10 CANNABIS USE DISORDER, MILD, ABUSE: ICD-10-CM

## 2022-08-11 PROCEDURE — H0035 MH PARTIAL HOSP TX UNDER 24H: HCPCS | Performed by: COUNSELOR

## 2022-08-11 PROCEDURE — 99214 OFFICE O/P EST MOD 30 MIN: CPT | Performed by: NURSE PRACTITIONER

## 2022-08-11 RX ORDER — BUSPIRONE HYDROCHLORIDE 10 MG/1
10 TABLET ORAL 2 TIMES DAILY
Qty: 60 TABLET | Refills: 0 | Status: SHIPPED | OUTPATIENT
Start: 2022-08-11 | End: 2022-09-10

## 2022-08-11 RX ORDER — QUETIAPINE FUMARATE 25 MG/1
25 TABLET, FILM COATED ORAL NIGHTLY
Qty: 30 TABLET | Refills: 0 | Status: SHIPPED | OUTPATIENT
Start: 2022-08-11 | End: 2022-09-10

## 2022-08-11 ASSESSMENT — ENCOUNTER SYMPTOMS
HEADACHES: 0
AGITATION: 0
NAUSEA: 0
FATIGUE: 0
DIAPHORESIS: 0
DIZZINESS: 0
CONFUSION: 0
TREMORS: 1

## 2022-08-11 NOTE — PROGRESS NOTES
LCSW spoke with pt about Cures act, adding or removing proxies in MyChart and any concerns about spousal access to her account - pt states her passwords are secure and at this time did not request note blocking.

## 2022-08-11 NOTE — GROUP NOTE
Date:  8/11/2022  Start Time:  10:40 AM  End Time:  11:40 AM  Renate Wallace, YOB: 1990   9 members attended group today.    Group Focus:  Goal of group was to establish and build social support, review coping skills, normalize the struggles of being human, and increase self awareness and self compassion.    About the Group:  Clinician started group with a prompting quote/song to facilitate group discussion.  Group engaged in active and supportive discussion. Topics discussed included structure and daily routine for MH recovery, boundaries for self-care, expectations in relationships.  Group members were able to offer insightful and supportive feedback and suggestions about how to manage difficult situations.  Group ended with a meditation/song.  Jacobo Benitez DO was onsite when services were rendered.        Patient  was an active group participant.  Assessment/observation of group participation/presentation: Pt was actively listening in group session for the majority of group. Pt did offer some supportive feedback to a peer in group about ideas for structuring and filling time.  Pt seemed supportive of peers. Pt displayed non verbal cues that she was relating and following discussion primarily through eye contact and head nodding.    Treatment plan areas addressed: Depression, Anxiety, Self Care, Mood dysregulation, Parenting Stress, Coping Skills, Mindfulness, CBT skills, Building strengths/resilience, Self-compassion and Other: SUDS   Visit Diagnosis:      ICD-10-CM ICD-9-CM   1. Bipolar II disorder (CMS/HCC)  F31.81 296.89   2. Alcohol use disorder, moderate, dependence (CMS/HCC)  F10.20 303.90   3. Moderate benzodiazepine use disorder (CMS/HCC)  F13.20 304.10   4. Cannabis use disorder, mild, abuse  F12.10 305.20   5. Obsessive-compulsive disorder, unspecified type  F42.9 300.3       Plan: Continue with PHP   Pt to F/U with treatment goals as outlined in treatment plan.  Pt to F/U with local  ED/call 911 should SI/HI arises.    Edward Garcia, LPC

## 2022-08-11 NOTE — GROUP NOTE
Date:  8/11/2022  Start Time:  12:10 PM  End Time:   1:10 PM  Renate Wallace, YOB: 1990  Psychoeducational/Skills Based Group  9 group members attended group today    Group Focus: Goal of group was to introduce skills and psychoeducation related to topic of PHP day.   Group members were provided instruction and opportunities to practice presented skills.  Clinician answered questions as they arose and shared how presented skills can be utilized on a daily basis to increase emotional wellness.      About the Group: CBT Session 4: CBT (Cognitive Behavioral Therapy) Psycho-Ed/Skills Based Group   Topic of curriculum was “CBT (Cognitive Behavioral Therapy)”. Clinician began group by briefly reviewing description of CBT from group 1. Clinician showed a video entitled “Parable of the Two Wolves” to describe how our cognitions, both positive and negative are strengthened or intensified when we give them attention and the power of mindfulness. Clinician provided psychoeducation about “core beliefs” and “cognitive distortions” and discussed how core beliefs impact our current thoughts, feelings and behaviors. Clinician provided a core beliefs worksheet as a way of helping group members challenge the negative thoughts associated with negative core beliefs. Clinician then introduced the “ATR worksheet” as a way of describing the relationship between thoughts, feelings and behaviors (cognitive triangle) and the process of challenging and reframing thoughts. Clinician also introduced “ANTS” worksheet to further describe the process of reframing thoughts. Clinician then facilitated an open discussion on CBT concepts presented and assessed for group member understanding. Clinician ended group by showing a YouTube video of [employer] show and an interview with Devon Marquez.    Jacobo Benitez DO was onsite when services were rendered.          Patient  was an active group participant.  Assessment/observation of  group participation/presentation: Pt was engaged, alert and attuned this afternoon during group. Pt appreciated the suggested approaches for challenging automatic negative thoughts and she also admitted to struggling with a great deal of minimization and labeling, as she expressed some challenges in her tumultus marriage. 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        Treatment plan areas addressed: Depression, Anxiety, Low Self Esteem, Coping Skills, CBT skills, Building strengths/resilience, Self-compassion and Daily functioning  Visit Diagnosis:      ICD-10-CM ICD-9-CM   1. Bipolar II disorder (CMS/HCC)  F31.81 296.89   2. Alcohol use disorder, moderate, dependence (CMS/HCC)  F10.20 303.90   3. Moderate benzodiazepine use disorder (CMS/HCC)  F13.20 304.10   4. Cannabis use disorder, mild, abuse  F12.10 305.20   5. Obsessive-compulsive disorder, unspecified type  F42.9 300.3       Plan: Continue with PHP   Pt to F/U with treatment goals as outlined in treatment plan.  Pt to F/U with local ED/call 911 should SI/HI arises.    Karyn Albarran, LPC

## 2022-08-11 NOTE — GROUP NOTE
Date: 8/11/2022  Start Time:   9:30 AM  End Time: 10:30 AM    Renate Wallace, YOB: 1990,  was an active group participant.    Community Check In Group  9 attended group today.     Group Focus: Goal of group was to welcome new and returning PHP members to the [facility], check in regarding group member needs, and assess safety.    About the Group: Clinician reviewed [facility] Code of Conduct and answered any questions that arose.  Clinician welcomed new members to the group and facilitated brief introductions of group members to one another.  Introduced topic/theme for the treatment day:CBT.  Encouraged group members to request support throughout the day as needed.  Clinician reviewed group members’ Morning Reflection Sheets and did a verbal check in with each group member regarding safety (SI/HI/self harm), safety planning, medication compliance, if they needed to consult with anyone today and individual treatment needs/goals for the day.  Session ended with a brief meditation/calming activity.   Jacobo Benitez DO was on site when services rendered.      Morning Reflection:   Depression:  0/5  Anxiety:  2/5 (call from doctor about thyroid)  Anger:  0/5  Thoughts of taking one's own life today?:  0/5 - None  Self Injury:  0/5 - None  Engaged in Self Injury since yesterday: No    Thoughts of hurting other people today?:  0/5 - None  Compulsive Behaviors?:  0-None  Compulsive Behaviors?:  No  Are you able to follow your safety plan?: Yes      I can/will:  Call someone, Journal, Use a coping skill, Listen to music and Use grounding with my 5 senses  Substance Use: No    Self Care: Yes      I completed my self care activity last night::  Container visualization and mindfulness    I am satisfied with my daily hygiene: Yes    Nourish:  Yes    Number of meals eaten yesterday:  2    Describe:  Normal and Nutritious  Sleep:  Yes    Used sleep aid?: Yes      Describe sleep:  Restful    Number of hours:  7  Social  Interaction:  Yes    Social Interaction:  Moderate    Moderate with:  Family  Physical Activity:  Yes    Describe Practice: 30 min walk  Mindful Activity:  Yes    Describe Practice:  Being present with kids   Medication:  Yes (discussed with [facility] CARTACHITA and will take at home today )    Medication:  Forgot  Thought Content:  Clear and Racing Thoughts  Which coping skills did you use yesterday? How did they help or not help?  What barriers did you face?:  Container visualization, opposite action, walking   Pt reported significant or positive event/step:  Talked with sister who will help me to schedule IOP by tomorrow afternoon   Pt identified goal for the treatment day:  No specific goal for tx today - Pt would like to talk with therapist about family session expectations   Pt treatment plan areas addressed:  Depression, Anxiety, Self care, Mood dysregulation, Parenting stress, Coping skills, Mindfulness, CBT skills, Building strengths/resilience, Self-compassion and Other (SUDS)  Pt requested consult with:  Therapist      Visit Diagnosis:      ICD-10-CM ICD-9-CM   1. Bipolar II disorder (CMS/HCC)  F31.81 296.89   2. Alcohol use disorder, moderate, dependence (CMS/HCC)  F10.20 303.90   3. Moderate benzodiazepine use disorder (CMS/HCC)  F13.20 304.10   4. Cannabis use disorder, mild, abuse  F12.10 305.20   5. Obsessive-compulsive disorder, unspecified type  F42.9 300.3       Assessment/Observations:  Pt shared that she received a call from the doctor this morning informing her that she could have cancer in her thryoid - 20% chance but they will no more in 2 weeks. Pt shared that she was also told by doctor that the mass could be removed via surgery. Pt shared that she feels better today after talking with group members yesterday about this. Pt shared that she is trying to remain mindful and present.   Plan:  Continue with PHP   Pt to F/U with treatment goals as outlined in treatment plan.  Pt to F/U with local ED/call  911 should SI/HI arises.    Edward Garcia, LPC

## 2022-08-11 NOTE — PROGRESS NOTES
LCSW met with pt on a break to review tx plan update - see plan for details. Pt spoke with her sister and has a plan to call IOPs by end of day tomorrow (LCSW has provided [facility] malin and today provided [facility] contact information). LCSW reviewed recommendation to step down to IOP and complete that LOC before reaching out to [facility] for interest in OP groups to ensure pt is at appropriate LOC. Pt ID concerns about guilt about working and then going directly to IOP in the evenings. Anticipates resenting the time commitment and not being able to have time to herself. LCSW encouraged pt to reframe thoughts around IOP to see it as a support for transition to new job and with potential need for thyroid surgery. Reviewed what to expect for family session.

## 2022-08-11 NOTE — GROUP NOTE
Date: 8/11/2022  Start Time:  1:20 PM  End Time:   2:20 PM  Renate Wallace, YOB: 1990,  was an active group participant.    Wrap Up Group  9 group members attended group today.   Group Focus: Goal of group was to wrap up and process the treatment day.  Assist  members in planning for the evening ahead and to assess and plan surrounding any  safety needs.      About the Group:  Clinician reviewed group members Afternoon  Reflection Sheets and did a verbal check in with each group member regarding safety  (SI/HI/self harm) and any necessary safety planning .  Session ended with a brief  meditation/calming activity.  Jacobo Benitez DO was onsite when  services rendered.        Afternoon Reflection:   Depression:  0/5  Anxiety:  2/5  Anger:  0/5  Thoughts of taking one's own life today?:  0/5 - None  Self Injury:  0/5 - None  Engaged in self-injury?  No  Thoughts of hurting other people today?:  0/5 - None  Compulsive Behaviors?:  0-None  Compulsive Behaviors?: No  Are you able to follow our safety plan?: Yes      I can/will:  Call someone, Use grounding with my 5 senses, Journal, Use a coping skill and Listen to music  The most significant moment of the day or insight for me was...:  Realizing that my opinion of myself is the only one that matters.   Three things I am grateful for today are:  1) Nail appointment; 2) Sunshine; 3) My mother-in-law   Did you meet your goal for today? If not, what might you do tomorrow or this week to achieve it?:  Yes I did.   My evening self-care plan is:  I am getting my nails done today.       Pt treatment plan areas addressed: Depression, Anxiety, Low Self Esteem, Coping Skills, Mindfulness, CBT skills, Building strengths/resilience and Self-compassion    Visit Diagnosis:      ICD-10-CM ICD-9-CM   1. Bipolar II disorder (CMS/HCC)  F31.81 296.89   2. Alcohol use disorder, moderate, dependence (CMS/HCC)  F10.20 303.90   3. Moderate benzodiazepine use disorder (CMS/HCC)   F13.20 304.10   4. Cannabis use disorder, mild, abuse  F12.10 305.20   5. Obsessive-compulsive disorder, unspecified type  F42.9 300.3       Assessment/Observations: Pt was very engaged and interested in the content of CBT therapy and the cognitive distortions. Pt was able to give and receive positive feedback and continues to graciously learn so much about her own mental health and the steps necessary for ongoing healing.       Plan: Continue with PHP   Pt to F/U with treatment goals as outlined in treatment plan.  Pt to F/U with local ED/call 911 should SI/HI arises.    Karyn Albarran, LPC

## 2022-08-11 NOTE — PROGRESS NOTES
PHP PSYCHIATRY FOLLOW UP/MEDICATION CHECK    Renate Wallace is a 31 y.o. female who presents for Follow-up and Med Management.    HPI  Feels sleep was disrupted last night- attributes to thyroid biopsy results.  Had an indeterminate result and are sending out for further analysis (will not have results back for another 1-2 weeks).  While anxiety was elevated yesterday, felt discussing it in groups was helpful, and did feel calmer by end of the day.    Trying to practice better sleep hygiene, with not staying on her phone at night.  On average sleep has been improving except for last night which was broken- attributes to elevated anxiety during the day with concerns over her thyroid.  Ongoing periods of obsessive thoughts though denies intrusive thoughts.     Reports Tuesday she did not eat through the day, felt light-headed later that evening.  Denies LOC, had one twitch of her hand.  Light-headedness lasted 15-20 seconds.  Reports all symptoms resolved after she sat down and ate and drank.  Reports yesterday ate two meals and not further episodes.  Had breakfast this morning.      Reports good interactions with her children.      Did not take Vyvanse today, still not taking on weekends.      Psychiatric ROS:   Sleep:Good- better beyond last night  Appetite: Fair, avg two meals,   Exercise: walking 30 min daily in the morning  ETOH/Substances: denies use, denies cravings.  Denies withdrawal symptoms.  SI/HI: denies, future oriented, feels safe at home.      Medical Review of Systems  Review of Systems   Constitutional: Negative for diaphoresis and fatigue.   Gastrointestinal: Negative for nausea.   Skin: Negative for rash.   Neurological: Positive for tremors (very slight tremor in right hand). Negative for dizziness and headaches.   Psychiatric/Behavioral: Negative for agitation and confusion.       PMSH: No changes were made to non-psychiatric medications/allergies/medical history.       Risk/Benefit/side Effects  discussed regarding the following medications:  buspirone  PDMP Queried: Yes   No new fills of alprazolam    Allergies: No Known Allergies    Current Outpatient Medications:   •  busPIRone (BUSPAR) 10 mg tablet, Take 1 tablet (10 mg total) by mouth 2 (two) times a day., , Disp: 60 tablet, Rfl: 0  •  QUEtiapine (SEROqueL) 25 mg tablet, Take 1 tablet (25 mg total) by mouth nightly., , Disp: 30 tablet, Rfl: 0  •  lamoTRIgine (LaMICtal) 25 mg tablet, Take 1 tablet (25 mg total) by mouth daily., , Disp: 30 tablet, Rfl: 0  •  QUEtiapine (SEROqueL) 50 mg tablet, Take 1 tablet (50 mg total) by mouth nightly for 15 days. Take one-half tablet for the first night, , Disp: 15 tablet, Rfl: 0  •  VYVANSE 60 mg capsule, Take 60 mg by mouth once daily., , Disp: , Rfl:        Objective     Physical Exam    There were no vitals taken for this visit.    Mental Status Exam  Appearance: well groomed and appropriate attire  Gait and Motor: no abnormal movements  Speech: normal rate/rhythm/volume  Mood: depressed and anxious  Affect: normal  Associations: coherent  Thought Process: goal-directed  Thought Content: no auditory or visual hallucinations. and appropriate to situation  Suicidality/Homicidality: denies  Judgement/Insight: fair insight and judgement  Orientation: person, place, time and situation  Attention: alert  Language: normal            [place] Suicide Severity Rating Scale:  Completed 8/3/22       Safe-T Assessment:  Not indicated        Labs  8/9/2022  UDS positive for stimulants    8/3/2022  UDS positive for stimulants     7/2022  CBC WNL  CMP no significant abnormals  TSH WNL     7/30/22 (from ED visit)  CMP no significant abnormals, LFTs WNL  UDS positive for cannabis and amphetamines/methamphetamines  CBC WNL  Ethanol level 295      ECG   None in Epic         Brief Psychiatric Formulation: Renate is a 31 year old female with a reported history of bipolar disorder and OCD as well as alcohol, benzodiazepine, and  cannabis use disorders presenting for [facility] admission after initial evaluation by [facility].  She reports recent precipitating factors for both her mood deterioration and substance use of work stressors and isolation during the COVID pandemic, as well as poor relationship with her .     She has tolerated restart of her previous regimen and addition of quetiapine.  She was agreeable to increasing her buspirone to target her anxiety and increasing her quetiapine to address mood stability.  Offered option to start naltrexone, currently she does not feel she has any cravings and declined wanting to see how she manages at a family bridal shower this weekend.  States she will be with her mother and sister who are aware of her MH and substance use difficulties and feels she would be able to seek support from them.  Advised that she needs to have a plan if she has a craving for alcohol consumption.  Advised if she changes her mind on naltrexone start before the weekend to alert this provider.  Also advised that we will send a discharge summary to her OP psychiatrist with final medication regimen.         Based on [place] Suicide Screen and current clinical assessment, patient is determined Low Risk.  Suicide Risk/Suicidal Ideation will not be added to patient's treatment plan.     Plan:    · Admit to PHP 8/5/2022  · Weekly weights and UDS, to complete UDS next week along with a lipid panel  · Continue lamotrigine 25 mg daily  · Increase buspirone to 10 mg BID for anxiety  · Increase quetiapine to 75 mg QHS for depression, anxiety, impulsivity, and sleep  · Encouraged to hold Vyvanse, will recheck BP today  · Ecnouraged naltrexone start  · Aftercare planning: Dual diagnosis IOP, individual therapy, return to OP psychiatry  · Patient to F/U with treatment goals as outlined in treatment plan.  · Patient to F/U with local ED or call 911 should SI/HI arise.    Recertification: I certify that PHP level of care continues to be  required, improvement is expected and without this medically necessary treatment, inpatient psychiatric care would be required.  Renate Wallace's response to therapeutic intervention has shown gradual improvement. Renate Wallace remains at risk for psychiatric hospitalization due to moderate to severe mood symptoms. Treatment goals and coordination of care with multidisciplinary team as outlined in updated treatment plan      Visit Diagnosis:    ICD-10-CM ICD-9-CM   1. Bipolar II disorder (CMS/[facility])  F31.81 296.89   2. Alcohol use disorder, moderate, dependence (CMS/HCC)  F10.20 303.90   3. Moderate benzodiazepine use disorder (CMS/HCC)  F13.20 304.10   4. Cannabis use disorder, mild, abuse  F12.10 305.20   5. Obsessive-compulsive disorder, unspecified type  F42.9 300.3       Duration:  30 minutes  CATRACHITA Jackson @ 10:25 AM

## 2022-08-12 ENCOUNTER — PHP (OUTPATIENT)
Dept: PSYCHIATRY | Facility: HOSPITAL | Age: 32
End: 2022-08-12
Attending: NURSE PRACTITIONER
Payer: COMMERCIAL

## 2022-08-12 ENCOUNTER — TELEPHONE (OUTPATIENT)
Dept: PSYCHIATRY | Facility: HOSPITAL | Age: 32
End: 2022-08-12
Payer: COMMERCIAL

## 2022-08-12 DIAGNOSIS — F12.10 CANNABIS USE DISORDER, MILD, ABUSE: ICD-10-CM

## 2022-08-12 DIAGNOSIS — F42.9 OBSESSIVE-COMPULSIVE DISORDER, UNSPECIFIED TYPE: ICD-10-CM

## 2022-08-12 DIAGNOSIS — F13.20 MODERATE BENZODIAZEPINE USE DISORDER (CMS/HCC): ICD-10-CM

## 2022-08-12 DIAGNOSIS — F10.20 ALCOHOL USE DISORDER, MODERATE, DEPENDENCE (CMS/HCC): ICD-10-CM

## 2022-08-12 DIAGNOSIS — F31.81 BIPOLAR II DISORDER (CMS/HCC): Primary | ICD-10-CM

## 2022-08-12 PROCEDURE — H0035 MH PARTIAL HOSP TX UNDER 24H: HCPCS | Performed by: COUNSELOR

## 2022-08-12 NOTE — GROUP NOTE
Date: 8/12/2022  Start Time:  1:20 PM  End Time:   2:20 PM  Renate Wallace, YOB: 1990,  was an active group participant.    Wrap Up Group  9 group members attended group today.   Group Focus: Goal of group was to wrap up and process the treatment day.  Assist  members in planning for the evening ahead and to assess and plan surrounding any  safety needs.      About the Group:  Clinician reviewed group members Afternoon  Reflection Sheets and did a verbal check in with each group member regarding safety  (SI/HI/self harm) and any necessary safety planning .  Session ended with a brief  meditation/calming activity.  Jacobo Benitez DO was onsite when  services rendered.        Afternoon Reflection:   Depression:  0/5  Anxiety:  1/5  Anger:  0/5  Thoughts of taking one's own life today?:  0/5 - None  Self Injury:  0/5 - None  Engaged in self-injury?  No  Thoughts of hurting other people today?:  0/5 - None  Compulsive Behaviors?:  0-None  Compulsive Behaviors?: No  Are you able to follow our safety plan?: Yes      I can/will:  Call someone, Use grounding with my 5 senses, Journal, Use a coping skill and Listen to music  The most significant moment of the day or insight for me was...:  Just feeling better about my weekend and knowing I have the support of my loved ones.   Three things I am grateful for today are:  1) Colored pencils; 2) Familial support; 3) The weekend  Did you meet your goal for today? If not, what might you do tomorrow or this week to achieve it?:  Yes I did.   My evening self-care plan is:  I am going to just relax and enjoy my kids. I planned to exercise tonight, but I am going to only do so if it feels right.       Pt treatment plan areas addressed: Depression, Anxiety, Low Self Esteem, Self Care, Coping Skills, Mindfulness, CBT skills, Building strengths/resilience and Self-compassion    Visit Diagnosis:      ICD-10-CM ICD-9-CM   1. Bipolar II disorder (CMS/[facility])  F31.81 296.89   2.  Alcohol use disorder, moderate, dependence (CMS/HCC)  F10.20 303.90   3. Moderate benzodiazepine use disorder (CMS/HCC)  F13.20 304.10   4. Cannabis use disorder, mild, abuse  F12.10 305.20   5. Obsessive-compulsive disorder, unspecified type  F42.9 300.3       Assessment/Observations: Pt was alert, attuned and engaged this afternoon during the wrap up group. Pt was grateful for good week of treatment and was able to make a mind body connection, in which she realizes she needs to take time to rest and restore. Pt anticipates an enjoyable weekend ahead with her children and loved ones.     Plan: Continue with PHP   Pt to F/U with treatment goals as outlined in treatment plan.  Pt to F/U with local ED/call 911 should SI/HI arises.    Karyn Albarran, LPC

## 2022-08-12 NOTE — GROUP NOTE
Date:  8/12/2022  Start Time:  12:10 PM  End Time:   1:10 PM  Renate Wallace, YOB: 1990   Attended Nursing Ed Group     The Nursing Education Group Topic is Self-Care.   RN began group with discussion of healthy nutrition, supplements, routine exercise and smoking cessation.  Group members were encouraged to identify unhealthy behaviors in their lives and incorporate healthy lifestyle modifications to promote good health.  Group members were provided a handout.    Jacobo Benitez DO was on site when services rendered.        Patient  was an active group participant.  Assessment/observation of group participation/presentation: Renate provided information with personal experiences on topics, was observed taking notes, and asked appropriate on topic questions.     Visit Diagnosis:     ICD-10-CM ICD-9-CM   1. Bipolar II disorder (CMS/HCC)  F31.81 296.89   2. Alcohol use disorder, moderate, dependence (CMS/HCC)  F10.20 303.90   3. Moderate benzodiazepine use disorder (CMS/HCC)  F13.20 304.10   4. Cannabis use disorder, mild, abuse  F12.10 305.20   5. Obsessive-compulsive disorder, unspecified type  F42.9 300.3       Plan:  Continue with PHP  Pt to F/U with treatment goals as outlined in treatment plan.  Pt to F/U with local ED/call 911 should SI/HI arises.    Lizzeth Cristina RN

## 2022-08-12 NOTE — PROGRESS NOTES
LCSW checked in with pt on break, pt has scheduled IOP intake with [facility] on 8/18/22 from 6-8 pm for their 3 day/week evening IOP to address SUDS and MH. LCSW encouraged pt's progress on goals in setting up aftercare.

## 2022-08-12 NOTE — GROUP NOTE
Date:  8/12/2022  Start Time:  10:40 AM  End Time:  11:40 AM  Renate Wallace, YOB: 1990   9 members attended group today.    Group Focus:  Goal of group was to establish and build social support, review coping skills, normalize the struggles of being human, and increase self awareness and self compassion.    About the Group:  Clinician started group with a prompting quote/song to facilitate group discussion.  Group engaged in active and supportive discussion. Topics discussed included feeling like a burden, difficulty asking for help and accepting help, accepting tx recommendations.  Group members were able to offer insightful and supportive feedback and suggestions about how to manage difficult situations.  Group ended with a meditation/song.  Jacobo Benitez DO was onsite when services were rendered.        Patient  was an active group participant.  Assessment/observation of group participation/presentation: Pt was engaged in group session and willing to offer supportive feedback to peers. Pt shared about her own experiences with lack of sleep and validated a group members experience. Pt shared about her own resistance to asking for and receiving help at times. Pt also related with another group member about clutter in her house and strategies that she uses to help organize. Pt was open and willing to share and support in group session.   Treatment plan areas addressed: Depression, Anxiety, Self Care, Mood dysregulation, Parenting Stress, Coping Skills, Mindfulness, CBT skills, Building strengths/resilience and Self-compassion  Visit Diagnosis:      ICD-10-CM ICD-9-CM   1. Bipolar II disorder (CMS/HCC)  F31.81 296.89   2. Alcohol use disorder, moderate, dependence (CMS/HCC)  F10.20 303.90   3. Moderate benzodiazepine use disorder (CMS/HCC)  F13.20 304.10   4. Cannabis use disorder, mild, abuse  F12.10 305.20   5. Obsessive-compulsive disorder, unspecified type  F42.9 300.3       Plan: Continue with PHP    Pt to F/U with treatment goals as outlined in treatment plan.  Pt to F/U with local ED/call 911 should SI/HI arises.    Edward Garcia, LPC

## 2022-08-12 NOTE — GROUP NOTE
Date: 8/12/2022  Start Time:   9:30 AM  End Time: 10:30 AM    Renate Wallace, YOB: 1990,  was an active group participant.    Community Check In Group  9 attended group today.     Group Focus: Goal of group was to welcome new and returning PHP members to the PHP, check in regarding group member needs, and assess safety.    About the Group: Clinician reviewed [facility] Group Code of Conduct and answered any questions that arose.  Clinician welcomed new members to the group and facilitated brief introductions of group members to one another.  Introduced topic/theme for the treatment day:Resiliency Building.  Encouraged group members to request support throughout the day as needed.  Clinician reviewed group members’ Morning Reflection Sheets and did a verbal check in with each group member regarding safety (SI/HI/self harm), safety planning, medication compliance, if they needed to consult with anyone today and individual treatment needs/goals for the day.  Session ended with a brief meditation/calming activity.   Jacobo Benitez DO was on site when services rendered.      Morning Reflection:   Depression:  0/5  Anxiety:  2/5  Anger:  0/5  Thoughts of taking one's own life today?:  0/5 - None  Self Injury:  0/5 - None  Engaged in Self Injury since yesterday: No    Thoughts of hurting other people today?:  0/5 - None  Compulsive Behaviors?:  0-None  Compulsive Behaviors?:  No  Are you able to follow your safety plan?: Yes      I can/will:  Call someone, Journal, Use a coping skill, Listen to music and Use grounding with my 5 senses  Substance Use: No    Self Care: Yes      I completed my self care activity last night::  Ice cream     I am satisfied with my daily hygiene: Yes    Nourish:  Yes    Number of meals eaten yesterday:  2    Describe:  Normal and Nutritious  Sleep:  Yes    Used sleep aid?: Yes      Describe sleep:  Restful    Number of hours:  7  Social Interaction:  Yes    Social Interaction:   Excessive    Excessive with:  Family  Physical Activity:  Yes    Describe Practice: not yet, but plan to walk today   Mindful Activity:  Yes    Describe Practice:  Got nails done   Medication:  Yes    Medication:  Prescribed  Thought Content:  Cloudy and Racing Thoughts  Which coping skills did you use yesterday? How did they help or not help?  What barriers did you face?:  Breathing, talked with mom and sister   Pt reported significant or positive event/step:  Scheduled IOP intake for 8/18/22 with [facility]   Pt identified goal for the treatment day:  Nothing specific for today   Pt treatment plan areas addressed:  Depression, Anxiety, Self care, Parenting stress, Mood dysregulation, Coping skills, Mindfulness, CBT skills, Building strengths/resilience, Self-compassion and Other (SUDS )      Visit Diagnosis:      ICD-10-CM ICD-9-CM   1. Bipolar II disorder (CMS/[facility])  F31.81 296.89   2. Alcohol use disorder, moderate, dependence (CMS/[facility])  F10.20 303.90   3. Moderate benzodiazepine use disorder (CMS/[facility])  F13.20 304.10   4. Cannabis use disorder, mild, abuse  F12.10 305.20   5. Obsessive-compulsive disorder, unspecified type  F42.9 300.3       Assessment/Observations:  Pt shared that she felt overwhelmed yesterday during a large family party. Pt shared that she also has felt nauseous after increasing a medication. Pt shared that she talked with her mom and sister during the party for support. Pt shared that she went outside and got some fresh air. Pt shared that she has a bridal shower tomorrow and would like to plan ahead for how to cope and manage anxiety. Pt shared that she would like to try yoga tomorrow morning as well to help with anxiety and relaxation.   Plan:  Continue with PHP   Pt to F/U with treatment goals as outlined in treatment plan.  Pt to F/U with local ED/call 911 should SI/HI arises.    Edward Garcia, LPC

## 2022-08-12 NOTE — TELEPHONE ENCOUNTER
LM with  regarding Renate's participation in PHP and left call back number to discuss patient's progress.  Confirmed fax number for d/c summary.  Dr. Mehta on vacation until next week.

## 2022-08-15 ENCOUNTER — PHP (OUTPATIENT)
Dept: PSYCHIATRY | Facility: HOSPITAL | Age: 32
End: 2022-08-15
Attending: NURSE PRACTITIONER
Payer: COMMERCIAL

## 2022-08-15 ENCOUNTER — TELEPHONE (OUTPATIENT)
Dept: PSYCHIATRY | Facility: HOSPITAL | Age: 32
End: 2022-08-15
Payer: COMMERCIAL

## 2022-08-15 DIAGNOSIS — F12.10 CANNABIS USE DISORDER, MILD, ABUSE: ICD-10-CM

## 2022-08-15 DIAGNOSIS — F10.20 ALCOHOL USE DISORDER, MODERATE, DEPENDENCE (CMS/HCC): Primary | ICD-10-CM

## 2022-08-15 DIAGNOSIS — F42.9 OBSESSIVE-COMPULSIVE DISORDER, UNSPECIFIED TYPE: ICD-10-CM

## 2022-08-15 DIAGNOSIS — F13.20 MODERATE BENZODIAZEPINE USE DISORDER (CMS/HCC): ICD-10-CM

## 2022-08-15 DIAGNOSIS — F31.81 BIPOLAR II DISORDER (CMS/HCC): ICD-10-CM

## 2022-08-15 PROCEDURE — H0035 MH PARTIAL HOSP TX UNDER 24H: HCPCS

## 2022-08-15 NOTE — GROUP NOTE
Date:  8/15/2022  Start Time:  10:40 AM  End Time:  11:40 AM  Renate Wallace, YOB: 1990   8 members attended group today.    Group Focus:  Goal of group was to establish and build social support, review coping skills, normalize the struggles of being human, and increase self awareness and self compassion.    About the Group:  Clinician started group with a prompting quote/song to facilitate group discussion.  Group engaged in active and supportive discussion. Topics discussed included healing from MH sx and realistic expectations, relationship with self, building self-esteem..  Group members were able to offer insightful and supportive feedback and suggestions about how to manage difficult situations.  Group ended with a meditation/song.  Jacobo Benitez DO was onsite when services were rendered.        Patient  was an active group participant.  Assessment/observation of group participation/presentation: Pt offered support and feedback to peers that was appropriate. Pt validated and affirmed group members' experiences. Pt was able to relate with certain discussion about grief, relationship strain, and building value or self-worth. Pt was providing thoughtful acts to help support a group member who was struggling with self-worth.   Treatment plan areas addressed: Depression, Anxiety, Self Care, Impulse Control, Mood dysregulation, Parenting Stress, Coping Skills, Mindfulness, CBT skills, Building strengths/resilience, Self-compassion and Other: SUDS   Visit Diagnosis:      ICD-10-CM ICD-9-CM   1. Alcohol use disorder, moderate, dependence (CMS/[facility])  F10.20 303.90   2. Bipolar II disorder (CMS/[facility])  F31.81 296.89   3. Moderate benzodiazepine use disorder (CMS/[facility])  F13.20 304.10   4. Cannabis use disorder, mild, abuse  F12.10 305.20   5. Obsessive-compulsive disorder, unspecified type  F42.9 300.3       Plan: Continue with PHP   Pt to F/U with treatment goals as outlined in treatment plan.  Pt to F/U with  local ED/call 911 should SI/HI arises.    Edward Garcia, LPC

## 2022-08-15 NOTE — GROUP NOTE
Date: 8/15/2022  Start Time:  1:20 PM  End Time:   2:20 PM  Renate Wallace, YOB: 1990,  was an active group participant.    Wrap Up Group  8 attended group today.   Group Focus: Goal of group was to wrap up and process the treatment day.  Assist  members in planning for the evening ahead and to assess and plan surrounding any  safety needs.      About the Group:  Clinician reviewed group members Afternoon  Reflection Sheets and did a verbal check in with each group member regarding safety  (SI/HI/self harm) and any necessary safety planning .  Session ended with a brief  meditation/calming activity.  Jacobo Benitez DO was onsite when  services rendered.        Afternoon Reflection:   Depression:  0/5  Anxiety:  3/5 (anxious about physical, a lot to do, normal anxiety, thyroid is still very much on my mind)  Anger:  0/5  Thoughts of taking one's own life today?:  0/5 - None  Self Injury:  0/5 - None  Engaged in self-injury?  No  Thoughts of hurting other people today?:  0/5 - None  Compulsive Behaviors?:  0-None  Compulsive Behaviors?: No (I used a fidget to manage my anxiety)  Are you able to follow our safety plan?: Yes      I can/will:  Use grounding with my 5 senses, Use a coping skill, Call someone, Journal and Listen to music  The most significant moment of the day or insight for me was...:  Progress not perfection, I want to keep trending in the right direction, keeping in mind that anxiety will still come up  Three things I am grateful for today are:  My life, I feel increasingly equipped to deal with life's stressors as they come. To have met (graduating peer) and to have learned from her!  Did you meet your goal for today? If not, what might you do tomorrow or this week to achieve it?:  Yes, I did  My evening self-care plan is:  Going for my physical/TB test today is a form of self care. Continue to practice mindfulness in my daily living.      Pt treatment plan areas addressed: Depression,  Anxiety, Chronic Pain/Medical Issues, Self Care, Mood dysregulation, Coping Skills and Mindfulness    Visit Diagnosis:      ICD-10-CM ICD-9-CM   1. Alcohol use disorder, moderate, dependence (CMS/[facility])  F10.20 303.90   2. Bipolar II disorder (CMS/[facility])  F31.81 296.89   3. Moderate benzodiazepine use disorder (CMS/[facility])  F13.20 304.10   4. Cannabis use disorder, mild, abuse  F12.10 305.20   5. Obsessive-compulsive disorder, unspecified type  F42.9 300.3       Assessment/Observations:  Renate shared about anxiety for upcoming doctor appointment for her new job. LCSW encouraged pt to incorporate additional self-care into her evening as she anticipated it could increase anxiety related to her thyroid. Pt ID she can reward herself afterward with a treat from the market.  Plan: Continue with PHP   Pt to F/U with treatment goals as outlined in treatment plan.  Pt to F/U with local ED/call 911 should SI/HI arises.    Radha Carpenter, DEYANIRAW

## 2022-08-15 NOTE — GROUP NOTE
Date:  8/15/2022  Start Time:  12:10 PM  End Time:   1:10 PM  eRnate Walalce, YOB: 1990  Psychoeducational/Skills Based Group  8 members attended group today    Group Focus: Goal of group was to introduce skills and psychoeducation related to topic of PHP day.   Group members were provided instruction and opportunities to practice presented skills.  Clinician answered questions as they arose and shared how presented skills can be utilized on a daily basis to increase emotional wellness.      About the Group: Self Care Session 6: Self Care Psycho-Ed/Skills Based Group Summary  Topic of curriculum was “Self Care.”  Clinician introduced the idea of setting goals for self-care and the importance of taking time for self amidst people’s busy lives. Clinician facilitated discussion on  “[place] of Control” to help group members practice recognizing what they can and cannot control.  Clinician utilized worksheet on “[place] of Control” to help group members increase identification of what is within their control. Clinician led a group discussion encouraging group members to identify ideas for self care. Group members highlighted strategies for self care that they have used past, present and will use in the future. Clinician utilized “Behavior Activation Worksheet”  from [employer] to help members set goal for self care that is realistic and able to be managed in a realistic time frame. Clinician closed the group session with a mindful breathing meditation from [employer] for self care.     Jacobo Benitez DO was onsite when services were rendered.          Patient  was quiet but attentive.  Assessment/observation of group participation/presentation: Renate was quiet in group but attentive, following the curriculum closely.    Treatment plan areas addressed: Self Care and Mood dysregulation  Visit Diagnosis:      ICD-10-CM ICD-9-CM   1. Alcohol use disorder, moderate, dependence (CMS/[facility])  F10.20 303.90    2. Bipolar II disorder (CMS/[facility])  F31.81 296.89   3. Moderate benzodiazepine use disorder (CMS/[facility])  F13.20 304.10   4. Cannabis use disorder, mild, abuse  F12.10 305.20   5. Obsessive-compulsive disorder, unspecified type  F42.9 300.3       Plan: Continue with PHP   Pt to F/U with treatment goals as outlined in treatment plan.  Pt to F/U with local ED/call 911 should SI/HI arises.    Gina Stewart, LPC

## 2022-08-15 NOTE — TELEPHONE ENCOUNTER
Pt’s sister called, reporting pt had a lot of anxiety at future RAGINI’s shower this weekend and left early due to conflicts with her . She reports pt had high anxiety and was having conflicts with mom and sister at the shower, and they told her to leave because she was raising her voice and having difficulty “pulling herself together.” The family wants to know how to support the pt when feeling that way - to be discussed at family session on Wednesday.

## 2022-08-15 NOTE — GROUP NOTE
Date: 8/15/2022  Start Time:   9:30 AM  End Time: 10:30 AM    Renate Wallace, YOB: 1990,  was an active group participant.    Community Check In Group  8 attended group today.     Group Focus: Goal of group was to welcome new and returning PHP members to the PHP, check in regarding group member needs, and assess safety.    About the Group: Clinician reviewed [facility] Group Code of Conduct and answered any questions that arose.  Clinician welcomed new members to the group and facilitated brief introductions of group members to one another.  Introduced topic/theme for the treatment day:Self Care.  Encouraged group members to request support throughout the day as needed.  Clinician reviewed group members’ Morning Reflection Sheets and did a verbal check in with each group member regarding safety (SI/HI/self harm), safety planning, medication compliance, if they needed to consult with anyone today and individual treatment needs/goals for the day.  Session ended with a brief meditation/calming activity.   Jacobo Benitez DO was on site when services rendered.      Morning Reflection:   Depression:  0/5  Anxiety:  3/5  Anger:  0/5  Thoughts of taking one's own life today?:  0/5 - None  Self Injury:  0/5 - None  Engaged in Self Injury since yesterday: No    Thoughts of hurting other people today?:  0/5 - None  Compulsive Behaviors?:  3-Mild  Compulsive Behaviors?:  Yes    Trigger?:  Anxious    How?:  Pt did not want to share    Who else knows?:  Parents   Are you able to follow your safety plan?: Yes      I can/will:  Call someone, Journal, Use a coping skill, Listen to music and Use grounding with my 5 senses  Substance Use: No    Self Care: Yes      I completed my self care activity last night::  Watched tv     I am satisfied with my daily hygiene: Yes    Nourish:  Yes    Number of meals eaten yesterday:  2    Describe:  Normal  Sleep:  Yes    Used sleep aid?: Yes      Describe sleep:  Broken    Number of hours:   5-6  Social Interaction:  Yes    Social Interaction:  Excessive    Excessive with:  Family  Physical Activity:  Yes    Describe Practice: walking, stretching   Mindful Activity:  Yes    Describe Practice:  Walking, aromatherapy   Medication:  Yes    Medication:  Prescribed  Thought Content:  Cloudy and Racing Thoughts  Which coping skills did you use yesterday? How did they help or not help?  What barriers did you face?:  Reached out to support people, mindfulness   Pt reported significant or positive event/step:  Scheduled appt for new job   Pt identified goal for the treatment day:  [place] to learn and benefit from group support  Pt treatment plan areas addressed:  Depression, Anxiety, Relationship issues/Communication Skills, Self care, Impulse control, Parenting stress, Mood dysregulation, Sleep disturbance, Coping skills, Mindfulness, CBT skills, Building strengths/resilience and Self-compassion (SUDS)  Pt requested consult with:  None      Visit Diagnosis:      ICD-10-CM ICD-9-CM   1. Alcohol use disorder, moderate, dependence (CMS/[facility])  F10.20 303.90   2. Bipolar II disorder (CMS/[facility])  F31.81 296.89   3. Moderate benzodiazepine use disorder (CMS/[facility])  F13.20 304.10   4. Cannabis use disorder, mild, abuse  F12.10 305.20   5. Obsessive-compulsive disorder, unspecified type  F42.9 300.3       Assessment/Observations:  Pt shared that she feels like a burden to people in her life, and is tired of always needing emotional support. Pt shared that she had a difficult time on Saturday at the bridal shower. Pt shared that she had an emotional conversation with her parents yesterday. Pt shared that she had compulsive behaviors but did not want to talk about it in front of group.   Plan:  Continue with PHP   Pt to F/U with treatment goals as outlined in treatment plan.  Pt to F/U with local ED/call 911 should SI/HI arises.    Edward Garcia, LPC

## 2022-08-16 ENCOUNTER — TELEPHONE (OUTPATIENT)
Dept: PSYCHIATRY | Facility: HOSPITAL | Age: 32
End: 2022-08-16
Payer: COMMERCIAL

## 2022-08-16 ENCOUNTER — PHP (OUTPATIENT)
Dept: PSYCHIATRY | Facility: HOSPITAL | Age: 32
End: 2022-08-16
Attending: NURSE PRACTITIONER
Payer: COMMERCIAL

## 2022-08-16 DIAGNOSIS — F31.81 BIPOLAR II DISORDER (CMS/HCC): ICD-10-CM

## 2022-08-16 DIAGNOSIS — F12.10 CANNABIS USE DISORDER, MILD, ABUSE: ICD-10-CM

## 2022-08-16 DIAGNOSIS — F13.20 MODERATE BENZODIAZEPINE USE DISORDER (CMS/HCC): ICD-10-CM

## 2022-08-16 DIAGNOSIS — F42.2 MIXED OBSESSIONAL THOUGHTS AND ACTS: ICD-10-CM

## 2022-08-16 DIAGNOSIS — F10.20 ALCOHOL USE DISORDER, MODERATE, DEPENDENCE (CMS/HCC): Primary | ICD-10-CM

## 2022-08-16 PROCEDURE — H0035 MH PARTIAL HOSP TX UNDER 24H: HCPCS | Performed by: COUNSELOR

## 2022-08-16 NOTE — GROUP NOTE
Date: 8/16/2022  Start Time:   9:30 AM  End Time: 10:30 AM    Renate Wallace, YOB: 1990,  was an active group participant.    Community Check In Group  9 attended group today.     Group Focus: Goal of group was to welcome new and returning PHP members to the PHP, check in regarding group member needs, and assess safety.    About the Group: Clinician reviewed [facility] Group Code of Conduct and answered any questions that arose.  Clinician welcomed new members to the group and facilitated brief introductions of group members to one another.  Introduced topic/theme for the treatment day:Distress Tolerance.  Encouraged group members to request support throughout the day as needed.  Clinician reviewed group members’ Morning Reflection Sheets and did a verbal check in with each group member regarding safety (SI/HI/self harm), safety planning, medication compliance, if they needed to consult with anyone today and individual treatment needs/goals for the day.  Session ended with a brief meditation/calming activity.   Jacobo Benitez DO was on site when services rendered.      Morning Reflection:   Depression:  0/5  Anxiety:  5/5  Anger:  0/5  Thoughts of taking one's own life today?:  0/5 - None  Self Injury:  0/5 - None  Engaged in Self Injury since yesterday: No    Thoughts of hurting other people today?:  0/5 - None  Compulsive Behaviors?:  0-None  Compulsive Behaviors?:  No  Are you able to follow your safety plan?: Yes      I can/will:  Call someone, Use grounding with my 5 senses, Journal, Use a coping skill and Listen to music  Substance Use: No    Self Care: Yes      I completed my self care activity last night::  Got a physical -refrained from screen time    I am satisfied with my daily hygiene: Yes    Nourish:  Yes    Number of meals eaten yesterday:  2    Describe:  Normal  Sleep:  Yes    Used sleep aid?: Yes      Describe sleep:  Restless and Broken    Number of hours:  4 or 5  Social Interaction:  Yes    " Social Interaction:  Moderate and Minimal    Moderate with:  Family    Minimal with:  Friends  Physical Activity:  Yes    Describe Practice: 30 min walk  Mindful Activity:  Yes    Describe Practice:  Crossword puzzles  Medication:  Yes    Medication:  Prescribed  Thought Content:  Cloudy and Racing Thoughts  Which coping skills did you use yesterday? How did they help or not help?  What barriers did you face?:  Reach out to my support people and talk things through, journaled, practiced good sleep hygiene and avoided screen  Pt reported significant or positive event/step:  Opened up to my aunts and cousin, got physical done, still have to do TB test results tomorrow  Pt identified goal for the treatment day:  I don't need help from group but need to 1) get transcripts sent 2) release records for my mom 3) reschedule psychiatry meeting  Pt treatment plan areas addressed:  Depression, Mood dysregulation, Sleep disturbance, Anxiety, Employment/vocational stress, Self care, Coping skills, Impulse control, Mindfulness and Chronic Pain/Medical Issues  Pt requested consult with:  None      Visit Diagnosis:      ICD-10-CM ICD-9-CM   1. Alcohol use disorder, moderate, dependence (CMS/[facility])  F10.20 303.90   2. Bipolar II disorder (CMS/[facility])  F31.81 296.89   3. Moderate benzodiazepine use disorder (CMS/[facility])  F13.20 304.10   4. Cannabis use disorder, mild, abuse  F12.10 305.20   5. Mixed obsessional thoughts and acts  F42.2 300.3       Assessment/Observations:  Pt shared that she is feeling anxious after a poor nights' sleep, she discussed having racing thoughts about her thyroid yesterday evening and experiencing sensations of half of her body being heavy. She reported increased social connection with supports. She was observed to be writing a lot during the group and seemed to be focused on tasks related to starting her new job. She wrote on the reflection but did not verbalize about having \"major anxiety on what my support " "people (sister and mom) have access in regards to my medical records. I know my paranoia isn't justified, but I don't want them to have access to everything as I'm so deeply fearful of things being used against me, or getting in the wrong hands.\"  Plan:  Continue with PHP   Pt to F/U with treatment goals as outlined in treatment plan.  Pt to F/U with local ED/call 911 should SI/HI arises.    Radha Carpenter LCSW          "

## 2022-08-16 NOTE — TELEPHONE ENCOUNTER
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 She reports that the only available times are Weds-Fri from 9-3 and that Friday is a training day. LCSW explained pt would have to discuss with psychiatry team (appointment tomorrow am) and that it would need to be medically appropriate to end early. LCSW explained that if appropriate, she may be able to leave a few mins. Early on Thursday to accommodate needs but made no guarantees.

## 2022-08-16 NOTE — GROUP NOTE
Date: 8/16/2022  Start Time:  1:20 PM  End Time:   2:20 PM  Renate Wallace, YOB: 1990,  was an active group participant.    Wrap Up Group  9 attended group today.   Group Focus: Goal of group was to wrap up and process the treatment day.  Assist  members in planning for the evening ahead and to assess and plan surrounding any  safety needs.      About the Group:  Clinician reviewed group members Afternoon  Reflection Sheets and did a verbal check in with each group member regarding safety  (SI/HI/self harm) and any necessary safety planning .  Session ended with a brief  meditation/calming activity.  Jacobo Benitez DO was onsite when  services rendered.        Afternoon Reflection:   Depression:  0/5  Anxiety:  3/5  Anger:  0/5  Thoughts of taking one's own life today?:  0/5 - None  Self Injury:  0/5 - None  Engaged in self-injury?  No  Thoughts of hurting other people today?:  0/5 - None  Compulsive Behaviors?:  0-None  Compulsive Behaviors?: Yes    Trigger?:  Anxiety    How?:  Tapping and some picking  Are you able to follow our safety plan?: Yes      I can/will:  Call someone, Journal, Use grounding with my 5 senses, Use a coping skill and Listen to music  The most significant moment of the day or insight for me was...:  Feeling ready to implement stratagies  Three things I am grateful for today are:  Food, kids,   Did you meet your goal for today? If not, what might you do tomorrow or this week to achieve it?:  Yes  My evening self-care plan is:  Treat self to a balanced lunch, work on radical acceptance      Pt treatment plan areas addressed: Depression, Anxiety, Employment/vocational stress, Impulse Control and Mood dysregulation    Visit Diagnosis:      ICD-10-CM ICD-9-CM   1. Alcohol use disorder, moderate, dependence (CMS/[facility])  F10.20 303.90   2. Bipolar II disorder (CMS/[facility])  F31.81 296.89   3. Moderate benzodiazepine use disorder (CMS/[facility])  F13.20 304.10   4. Cannabis use disorder,  mild, abuse  F12.10 305.20   5. Mixed obsessional thoughts and acts  F42.2 300.3       Assessment/Observations:  Renate shared her gratitude for a peer checking in on her as well as strides she is making toward return to work.  Plan: Continue with PHP   Pt to F/U with treatment goals as outlined in treatment plan.  Pt to F/U with local ED/call 911 should SI/HI arises.    Gina Stewart, LPC

## 2022-08-16 NOTE — GROUP NOTE
Date:  8/16/2022  Start Time:  10:40 AM  End Time:  11:40 AM  Renate Wallace, YOB: 1990  9 members attended group today.    Group Focus:  Goal of group was to establish and build social support, review coping skills, normalize the struggles of being human, and increase self awareness and self compassion.    About the Group:  Clinician started group with a prompting quote/song to facilitate group discussion.  Group engaged in active and supportive discussion. Topics discussed included mindfulness - letting go, grounding; making changes within [place] of control; reframing anxious thoughts.  Group members were able to offer insightful and supportive feedback and suggestions about how to manage difficult situations.  Group ended with a meditation/song.  Jacobo Benitez DO was onsite when services were rendered.        Patient  was a passive group participant.  Assessment/observation of group participation/presentation: Renate engaged with the video materials and shared about how she clings to clutter and other things in her environment that she cannot control. She offered a reframe for a peer. She was observed to be writing in a notebook during other portions of group.   Treatment plan areas addressed: Depression, Anxiety, Mood dysregulation, Coping Skills and CBT skills  Visit Diagnosis:      ICD-10-CM ICD-9-CM   1. Alcohol use disorder, moderate, dependence (CMS/HCC)  F10.20 303.90   2. Bipolar II disorder (CMS/HCC)  F31.81 296.89   3. Moderate benzodiazepine use disorder (CMS/HCC)  F13.20 304.10   4. Cannabis use disorder, mild, abuse  F12.10 305.20   5. Mixed obsessional thoughts and acts  F42.2 300.3       Plan: Continue with PHP   Pt to F/U with treatment goals as outlined in treatment plan.  Pt to F/U with local ED/call 911 should SI/HI arises.    Radha Carpenter LCSW

## 2022-08-16 NOTE — PROGRESS NOTES
Per pt request, LCSW provided with a psychoeducational handout on Bipolar d/o from [employer]. LCSW reviewed purpose of family session and that pt's physical medical records are not provided to CC as part of the family session, as pt indicated on check in sheet this morning that she had concerns around others being able to access her records.

## 2022-08-17 ENCOUNTER — PHP (OUTPATIENT)
Dept: PSYCHIATRY | Facility: HOSPITAL | Age: 32
End: 2022-08-17
Attending: NURSE PRACTITIONER
Payer: COMMERCIAL

## 2022-08-17 ENCOUNTER — NURSE ONLY (OUTPATIENT)
Dept: PSYCHIATRY | Facility: HOSPITAL | Age: 32
End: 2022-08-17

## 2022-08-17 ENCOUNTER — PHP (OUTPATIENT)
Dept: PSYCHIATRY | Facility: HOSPITAL | Age: 32
End: 2022-08-17
Payer: COMMERCIAL

## 2022-08-17 ENCOUNTER — APPOINTMENT (OUTPATIENT)
Dept: LAB | Facility: CLINIC | Age: 32
End: 2022-08-17
Attending: NURSE PRACTITIONER
Payer: COMMERCIAL

## 2022-08-17 DIAGNOSIS — F13.20 MODERATE BENZODIAZEPINE USE DISORDER (CMS/HCC): ICD-10-CM

## 2022-08-17 DIAGNOSIS — F42.2 MIXED OBSESSIONAL THOUGHTS AND ACTS: ICD-10-CM

## 2022-08-17 DIAGNOSIS — F31.81 BIPOLAR II DISORDER (CMS/HCC): Primary | ICD-10-CM

## 2022-08-17 DIAGNOSIS — F42.9 OBSESSIVE-COMPULSIVE DISORDER, UNSPECIFIED TYPE: ICD-10-CM

## 2022-08-17 DIAGNOSIS — F10.20 ALCOHOL USE DISORDER, MODERATE, DEPENDENCE (CMS/HCC): ICD-10-CM

## 2022-08-17 DIAGNOSIS — F12.10 CANNABIS USE DISORDER, MILD, ABUSE: ICD-10-CM

## 2022-08-17 DIAGNOSIS — F10.20 ALCOHOL USE DISORDER, MODERATE, DEPENDENCE (CMS/HCC): Primary | ICD-10-CM

## 2022-08-17 DIAGNOSIS — F31.81 BIPOLAR II DISORDER (CMS/HCC): ICD-10-CM

## 2022-08-17 PROCEDURE — 80307 DRUG TEST PRSMV CHEM ANLYZR: CPT | Performed by: NURSE PRACTITIONER

## 2022-08-17 PROCEDURE — H0035 MH PARTIAL HOSP TX UNDER 24H: HCPCS

## 2022-08-17 PROCEDURE — 36415 COLL VENOUS BLD VENIPUNCTURE: CPT | Performed by: NURSE PRACTITIONER

## 2022-08-17 PROCEDURE — 99214 OFFICE O/P EST MOD 30 MIN: CPT | Performed by: NURSE PRACTITIONER

## 2022-08-17 PROCEDURE — 80061 LIPID PANEL: CPT | Performed by: NURSE PRACTITIONER

## 2022-08-17 ASSESSMENT — COGNITIVE AND FUNCTIONAL STATUS - GENERAL
EST. PREMORBID INTELLIGENCE: NO CHANGE
ATTENTION: WNL
IMPULSE CONTROL: IMPAIRED, MINIMALLY
ORIENTATION: FULLY ORIENTED
PERCEPTUAL FUNCTION: NORMAL
CONCENTRATION: WNL
DELUSIONS: NONE OR AGE APPROPRIATE
INSIGHT: IMPAIRED, MINIMALLY
LIBIDO: NON-CONTRIBUTORY
APPEARANCE: WELL GROOMED
RECENT MEMORY: WNL
APPETITE: NO CHANGE
THOUGHT_CONTENT: CONCRETE;GUARDED
AROUSAL LEVEL: ALERT
MOOD: ANXIOUS
EYE_CONTACT: WNL
THOUGHT_PROCESS: WORRY
SLEEP_WAKE_CYCLE: NO CHANGE
SPEECH: REGULAR
PSYCHOMOTOR FUNCTIONING: RESTLESS
AFFECT: TEARFUL;FULL RANGE
REMOTE MEMORY: WNL

## 2022-08-17 ASSESSMENT — ENCOUNTER SYMPTOMS
FATIGUE: 0
NAUSEA: 0
DIZZINESS: 0
TREMORS: 0
HEADACHES: 0

## 2022-08-17 NOTE — PROGRESS NOTES
LCSW met with pt briefly while waiting for her psychiatry visit per pt request. Pt again expressed worry about her family having access to her medical records. Pt ID fear that her  could subpoena her medical records through her family that is involved as CC. LCSW again explained to pt the purpose of DEBI for family session as part of collateral contact in PHP. 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 LCSW encouraged pt to advocate for her needs to be able to move into the classroom when finished with PHP. Pt has not yet rescheduled OP psychiatry appointment - LCSW requested pt call today and reiterated importance of following up with OP therapy referrals list. Pt stated she will text her former therapist again to inquire about scheduling.

## 2022-08-17 NOTE — GROUP NOTE
Date: 8/17/2022  Start Time:   9:30 AM  End Time: 10:30 AM    Renate Wallace, YOB: 1990,  was an active group participant.    Community Check In Group  8 attended group today.     Group Focus: Goal of group was to welcome new and returning PHP members to the [facility], check in regarding group member needs, and assess safety.    About the Group: Clinician reviewed [facility] Group Code of Conduct and answered any questions that arose.  Clinician welcomed new members to the group and facilitated brief introductions of group members to one another.  Introduced topic/theme for the treatment day:Trauma.  Encouraged group members to request support throughout the day as needed.  Clinician reviewed group members’ Morning Reflection Sheets and did a verbal check in with each group member regarding safety (SI/HI/self harm), safety planning, medication compliance, if they needed to consult with anyone today and individual treatment needs/goals for the day.  Session ended with a brief meditation/calming activity.   Jacobo Benitez DO was on site when services rendered.      Morning Reflection:   Depression:  0/5  Anxiety:  3/5  Anger:  1/5  Thoughts of taking one's own life today?:  0/5 - None  Self Injury:  0/5 - None  Engaged in Self Injury since yesterday: No    Thoughts of hurting other people today?:  0/5 - None  Compulsive Behaviors?:  0-None  Compulsive Behaviors?:  No  Are you able to follow your safety plan?: Yes      I can/will:  Call someone, Journal, Use a coping skill, Listen to music and Use grounding with my 5 senses  Substance Use: No    Self Care: Yes      I completed my self care activity last night::  Ate a good meal     I am satisfied with my daily hygiene: Yes    Nourish:  Yes    Number of meals eaten yesterday:  2    Describe:  Normal  Sleep:  Yes    Used sleep aid?: Yes      Describe sleep:  Restful    Number of hours:  7-8  Social Interaction:  Yes    Social Interaction:  Moderate    Moderate with:   Family  Physical Activity:  Yes    Describe Practice: gardening   Mindful Activity:  Yes    Describe Practice:  Listening to music lyrics   Medication:  Yes    Medication:  Prescribed  Thought Content:  Clear and Irritable  Which coping skills did you use yesterday? How did they help or not help?  What barriers did you face?:  Reviewed radical acceptance, spent time outdoors, did thought reframing   Pt reported significant or positive event/step:  Socialized with a stranger at the store and felt positive, made cookies with my kids  Pt identified goal for the treatment day:  Have family support meeting today and need to be prepared for what I will say   Pt treatment plan areas addressed:  Depression, Anxiety, Relationship issues/Communication Skills, Employment/vocational stress, Self care, Impulse control, Mood dysregulation, Parenting stress, Mindfulness, CBT skills, Building strengths/resilience and Self-compassion (SUDS)  Pt requested consult with:  None      Visit Diagnosis:      ICD-10-CM ICD-9-CM   1. Alcohol use disorder, moderate, dependence (CMS/[facility])  F10.20 303.90   2. Bipolar II disorder (CMS/[facility])  F31.81 296.89   3. Moderate benzodiazepine use disorder (CMS/[facility])  F13.20 304.10   4. Cannabis use disorder, mild, abuse  F12.10 305.20       Assessment/Observations:  Pt shared that she is feeling irritated this morning due to text from  but also interactions with tx providers. 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 Pt shared that she advocated for herself and asked if she could leave one day early or leave early on her last PHP tx day. Pt shared that she felt unheard and not trusted. Pt shared that she has experience these things before, and typically has rebelled when feeling that others don't trust her. LPC validated pt's concerns and encouraged her to use coping skills throughout the day to help manage stress. Pt is also concerned about her family meeting  with sister and mom. Pt seemed fidgety and restless throughout group session.  Pt was honest about how she is feeling. Pt was willing to share with the group about her experiences.   Plan:  Continue with PHP   Pt to F/U with treatment goals as outlined in treatment plan.  Pt to F/U with local ED/call 911 should SI/HI arises.    Edward Garcia, LPC

## 2022-08-17 NOTE — GROUP NOTE
Date: 8/17/2022  Start Time:  1:20 PM  End Time:   2:20 PM  Renate Wallace, YOB: 1990,  was an active group participant.    Wrap Up Group  8 attended group today.   Group Focus: Goal of group was to wrap up and process the treatment day.  Assist  members in planning for the evening ahead and to assess and plan surrounding any  safety needs.      About the Group:  Clinician reviewed group members Afternoon  Reflection Sheets and did a verbal check in with each group member regarding safety  (SI/HI/self harm) and any necessary safety planning .  Session ended with a brief  meditation/calming activity.  Jacobo Benitez DO was onsite when  services rendered.        Afternoon Reflection:   Depression:  0/5  Anxiety:  2/5  Anger:  0/5  Thoughts of taking one's own life today?:  0/5 - None  Self Injury:  0/5 - None  Engaged in self-injury?  No  Thoughts of hurting other people today?:  0/5 - None  Compulsive Behaviors?:  0-None  Compulsive Behaviors?: No  Are you able to follow our safety plan?: Yes      I can/will:  Call someone, Use a coping skill, Use grounding with my 5 senses, Journal and Listen to music  The most significant moment of the day or insight for me was...:  That I'm not alone in my struggles and the clearness of mind to manage life stressors  Three things I am grateful for today are:  1) this group  2) good smelling candles  3) driving and enjoying music  Did you meet your goal for today? If not, what might you do tomorrow or this week to achieve it?:  I met my goal  My evening self-care plan is:  Complete my TB test so I can submit the results to my employer - self-care in preparing ahead. Preparing for my job, listen to music      Pt treatment plan areas addressed: Depression, Anxiety, Trauma and Coping Skills    Visit Diagnosis:      ICD-10-CM ICD-9-CM   1. Alcohol use disorder, moderate, dependence (CMS/HCC)  F10.20 303.90   2. Bipolar II disorder (CMS/HCC)  F31.81 296.89   3. Moderate  benzodiazepine use disorder (CMS/HCC)  F13.20 304.10   4. Cannabis use disorder, mild, abuse  F12.10 305.20       Assessment/Observations:  Pt reported improved anxiety across the day, though during the group appeared restless in body language. She reported being able to focus despite writing notes during psychoed. Pt reported still feeling like a burden to her family in some ways and LCSW reiterated purpose of upcoming family session as support.  Plan: Continue with PHP   Pt to F/U with treatment goals as outlined in treatment plan.  Pt to F/U with local ED/call 911 should SI/HI arises.    Radha Carpenter, DEYANIRAW

## 2022-08-17 NOTE — PROGRESS NOTES
PHP PSYCHIATRY FOLLOW UP/MEDICATION CHECK    Renate Wallace is a 31 y.o. female who presents for Follow-up, Med Management, and Discharge From Treatment.    HPI  Seen for PHP discharge.  Notes difficulties with ongoing anxiety though feels she has seen some improvement.  Working on radical acceptance and has found multiple topics helpful in PHP.   Anxiety has elevated periodically in the context of concerns with a recent thyroid biopsy though overall improved.  Tolerating recent medication adjustments.  Overall less anxious and sleep has improved with quetiapine.  Continues with lamotrigine, aware she needs to remain on for a full two weeks prior to increasing dose.    Left early from the [place] baby shower this past weekend, though states she did not drink.  Denies recent alcohol or benzodiazepine use, however, did fill alprazolam on 8/12/2022- reports she accidentally filled all prescriptions and then returned the alprazolam in the med disposal bin. Did not complete UDS ordered from earlier this week but expressed intention of completing today.    Psychiatric ROS:   Sleep:Good, avg 7-8 hrs, has had two nights of disrupted sleep during program related to anxiety  Appetite: Fair, avg two meals, improved since admission  Exercise: walking daily in the morning  ETOH/Substances: denies use, denies cravings.    SI/HI: denies, future oriented, feels safe at home.      Medical Review of Systems  Review of Systems   Constitutional: Negative for fatigue.   Gastrointestinal: Negative for nausea.   Skin: Negative for rash.   Neurological: Negative for dizziness, tremors and headaches.       PMSH: No changes were made to non-psychiatric medications/allergies/medical history.       Risk/Benefit/side Effects discussed regarding the following medications:  buspirone  PDMP Queried: Yes   Alprazolam 0.5 mg #30 filled 8/12/22  Vyvanse 60 mg #30 filled 8/12/22    Allergies: No Known Allergies    Current Outpatient Medications:   •   busPIRone (BUSPAR) 10 mg tablet, Take 1 tablet (10 mg total) by mouth 2 (two) times a day., , Disp: 60 tablet, Rfl: 0  •  lamoTRIgine (LaMICtal) 25 mg tablet, Take 1 tablet (25 mg total) by mouth daily., , Disp: 30 tablet, Rfl: 0  •  QUEtiapine (SEROqueL) 25 mg tablet, Take 1 tablet (25 mg total) by mouth nightly., , Disp: 30 tablet, Rfl: 0  •  QUEtiapine (SEROqueL) 50 mg tablet, Take 1 tablet (50 mg total) by mouth nightly for 15 days. Take one-half tablet for the first night, , Disp: 15 tablet, Rfl: 0  •  VYVANSE 60 mg capsule, Take 60 mg by mouth once daily., , Disp: , Rfl:        Objective     There were no vitals taken for this visit.    Mental Status Exam  Appearance: well groomed and appropriate attire  Gait and Motor: no abnormal movements  Speech: normal rate/rhythm/volume  Mood: depressed and anxious  Affect: good eye contact, mask on  Associations: coherent  Thought Process: goal directed with periods of tangential thoughts  Thought Content: no auditory or visual hallucinations. and appropriate to situation  Suicidality/Homicidality: denies  Judgement/Insight: fair insight and judgement  Orientation: person, place, time and situation  Attention: alert  Language: normal       PHQ-9: Total Score-OWL Transcribed: 4  Thoughts that You Would be Better Off Dead or of Hurting Yourself in Some Way: 0-->not at all  MONICA-7 Total Score-OWL Transcribed: 7    [place] Suicide Severity Rating Scale:  Completed 8/3/22       Safe-T Assessment:  Not indicated        Labs  Never completed ordered lipid panel    8/9/2022  UDS positive for stimulants    8/3/2022  UDS positive for stimulants     7/2022  CBC WNL  CMP no significant abnormals  TSH WNL     7/30/22 (from ED visit)  CMP no significant abnormals, LFTs WNL  UDS positive for cannabis and amphetamines/methamphetamines  CBC WNL  Ethanol level 295      ECG   None in Epic         Brief Psychiatric Formulation: Renate is a 31 year old female with a reported history of  bipolar disorder and OCD as well as alcohol, benzodiazepine, and cannabis use disorders admitted to [facility] after initial evaluation by [facility].  She reports recent precipitating factors for both her mood deterioration and substance use of work stressors and isolation during the COVID pandemic, as well as poor relationship with her .     She has tolerated restart of her previous regimen of buspirone and lamotrigine (started 8/5/22).  Buspirone was increased to a final dose of 10 mg twice daily to target anxiety.  Quetiapine was added and increased to a final dose of 75 mg to target anxiety and mood stability, as well as assist with insomnia, and was tolerated well.  Discussed metabolic risks with quetiapine and potential of tapering off once lamotrigine dose could be increased.  She was offered naltrexone to assist with her sobriety though this was declined as she felt she was able to maintain her sobriety without. Reviewed her recent fill of alprazolam and missing her ordered urine drug screen for the week, a requirement for her to complete to remain in the program, which she agreed to complete.    Pt is at chronic elevated risk of intentional harm to self given hx of anxiety, depression, and substance use. Her acute risk is elevated by ongoing depression/anxiety sx and recent substance use,  though this acute risk is mitigated by lack of current suicidal ideation, lack of plan/intent, commitment to children, goal of returning to work this year, treatment-seeking behavior, and future orientation.  She has benefited from PHP programming and recent medication changes.   In sum, her acute risk of intentional harm to self is not significantly elevated from usual baseline as to warrant ongoing PHP treatment and she is appropriate to transition to a dual diagnosis [facility].         Based on [place] Suicide Screen and current clinical assessment, patient is determined Low Risk.  Suicide Risk/Suicidal Ideation will not be added  to patient's treatment plan.     Plan:    · Plan to discharge from [facility] 8/18/22 as scheduled unless acute safety concerns occur  · To complete UDS  · Continue lamotrigine 25 mg daily to target mood stability  · Continue buspirone 10 mg BID for anxiety  · Continue quetiapine 75 mg QHS for depression, anxiety, impulsivity, and sleep  · Continue Vyvanse as prescribed by OP psychiatrist, would benefit from an alternative  · Ecnouraged naltrexone start  · Aftercare: Dual diagnosis IOP, individual therapy, return to OP psychiatry  · Patient to F/U with treatment goals as outlined in treatment plan.  · Patient to F/U with local ED or call 911 should SI/HI arise.        Visit Diagnosis:    ICD-10-CM ICD-9-CM   1. Bipolar II disorder (CMS/[facility])  F31.81 296.89   2. Alcohol use disorder, moderate, dependence (CMS/HCC)  F10.20 303.90   3. Moderate benzodiazepine use disorder (CMS/[facility])  F13.20 304.10   4. Cannabis use disorder, mild, abuse  F12.10 305.20   5. Obsessive-compulsive disorder, unspecified type  F42.9 300.3       Duration:  25 minutes  CATRACHITA Jackson @ 8:30 PM

## 2022-08-17 NOTE — PROGRESS NOTES
PHP Psychotherapy Note    Renate Wallace is a 31 y.o. female who presents for Follow-up.     Treatment Plan areas addressed during this visit:  Mood dysregulation, trauma, discharge planning    Mental Status Exam:  Arousal Level: Alert  Appearance: Well Groomed  Speech: Regular  Psychomotor Functioning: Restless  Eye Contact: WNL  Est. Premorbid Intelligence: No change  Orientation: Fully oriented  Attention: WNL  Concentration: WNL  Recent Memory: WNL  Remote Memory: WNL  Thought Content: [place], Guarded  Thought Process: Worry  Insight: Impaired, minimally  Perceptual Function: Normal  Delusions: None or age appropriate  Sleeping: No Change  Appetite: No Change  Libido: Non-Contributory  Affect: Tearful, Full Range  Mood: Anxious     PHQ-9: Total Score-OWL Transcribed: 4  Thoughts that You Would be Better Off Dead or of Hurting Yourself in Some Way: 0-->not at all  MONICA-7 Total Score-OWL Transcribed: 7    [place] Suicide Severity Rating Scale:  Not indicated          Assessment:   LCSW met with pt, sister Kaila and mom Pamella for PHP family session. Pt presented as teacher-like in sharing with her family the PHP modules. She was tearful in sharing about shame and strategies learned in PHP such as self-compassion. She ID cognitive distortions and discussed benefits of strategies such as opposite action and PLEASE, grounding to manage stress. Pt stated plan to follow through with IOP to continue to reinforce skills. LCSW facilitated discussion between family about continued support and encouragement for Renate as part of stepdown. LCSW provided psychoeducation about window of tolerance and the family agreed to use this to check in with pt and this will prompt pt to utilize skills. Pt shared affirmations with her family and requested they remind her of these. LCSW provided psychoed about the need for consistency in tx with bipolar d/o and SUDS tx and reinforced need to reschedule next OP psychiatry visit, to continue  with that provider for now until pt may decide to seek out new provider. Pt reported working on getting back in touch with former therapist and DEYANIRAW printed the OP therapy referrals list that has been provided to pt and reiterated importance of long-term ongoing MH care to support pt. Pt stated plan to obtain labs today before leaving office.    Plan:    Patient to F/U with PHP.  Patient to F/U with treatment goals as outlined in treatment plan.  Patient to F/U with local ED or call 911 should SI/HI arise.  Visit Diagnosis:    ICD-10-CM ICD-9-CM   1. Bipolar II disorder (CMS/HCC)  F31.81 296.89   2. Alcohol use disorder, moderate, dependence (CMS/HCC)  F10.20 303.90   3. Moderate benzodiazepine use disorder (CMS/HCC)  F13.20 304.10   4. Cannabis use disorder, mild, abuse  F12.10 305.20   5. Mixed obsessional thoughts and acts  F42.2 300.3       Time  Start Time: 1420  End Time: 1515  Total Time: 55  Radha Carpenter LCSW @ 4:18 PM

## 2022-08-17 NOTE — GROUP NOTE
Date:  8/17/2022  Start Time:  10:40 AM  End Time:  11:40 AM  Renate Wallace, YOB: 1990   8 members attended group today.    Group Focus:  Goal of group was to establish and build social support, review coping skills, normalize the struggles of being human, and increase self awareness and self compassion.    About the Group:  Clinician started group with a prompting quote/song to facilitate group discussion.  Group engaged in active and supportive discussion. Topics discussed included managing anxiety sx with adaptive coping skills, self-compassion, inner critic and reframing negative thoughts.  Group members were able to offer insightful and supportive feedback and suggestions about how to manage difficult situations.  Group ended with a meditation/song.  Jacobo Benitez DO was onsite when services were rendered.        Patient  was an active group participant.  Assessment/observation of group participation/presentation: Pt was engaged in group discussion and seemed to be actively listening to peers. Pt also seemed restless during group session, more so than she has throughout PHP tx. Pt was getting up and down a lot. Pt was organ zing papers. Pt did share that she was aware of these behaviors and was actively listening to peers. Pt did not have an opportunity to share more about the restlessness and what was driving it, other than family session coming up this afternoon and starting a new job this week.   Treatment plan areas addressed: Depression, Anxiety, Relationship issues/Communication skills, Employment/vocational stress, Self Care, Mood dysregulation, Parenting Stress, Coping Skills, Mindfulness, CBT skills, Building strengths/resilience and Self-compassion  Visit Diagnosis:      ICD-10-CM ICD-9-CM   1. Alcohol use disorder, moderate, dependence (CMS/[facility])  F10.20 303.90   2. Bipolar II disorder (CMS/[facility])  F31.81 296.89   3. Moderate benzodiazepine use disorder (CMS/[facility])  F13.20 304.10   4.  Cannabis use disorder, mild, abuse  F12.10 305.20       Plan: Continue with PHP   Pt to F/U with treatment goals as outlined in treatment plan.  Pt to F/U with local ED/call 911 should SI/HI arises.    Edward Garcia, LPC

## 2022-08-17 NOTE — GROUP NOTE
Date:  8/17/2022  Start Time:  12:10 PM  End Time:   1:10 PM  Renate Wallace, YOB: 1990  Psychoeducational/Skills Based Group  8 members attended group today    Group Focus: Goal of group was to introduce skills and psychoeducation related to topic of PHP day.   Group members were provided instruction and opportunities to practice presented skills.  Clinician answered questions as they arose and shared how presented skills can be utilized on a daily basis to increase emotional wellness.      About the Group: Trauma Session 8: Trauma Psycho Ed/Skills Based Group Summary  Topic of Curriculum was “Trauma”. Clinician educated group members about the body’s response to trauma and shared the  “Brain on Trauma” worksheet from [employer]. Group members were educated about neuroplasticity and how the brain is changeable through practice of coping skills, grounding techniques and time. Clinician showed a video on Neuroplasticity from [employer]  to highlight changeability of neurons with time and effort.  Clinician introduced and facilitated practice with group members of two grounding techniques, Progressive Muscle Relaxation from [employer] and Bilateral Stimulation “Butterfly hug” from [employer]. Group members then discussed their experiences with one another and Clinician closed group session with a grounding meditation of body scan from   Minds: Cherise Dalton.    Jacobo Benitez DO was onsite when services were rendered.          Patient  was a passive group participant.  Assessment/observation of group participation/presentation: Renate participated in the experiential exercises of the group. She appeared to be reading "[employer] onboarding materials at times and was making notes on post-its, likely related to pt's anxiety about upcoming family session. She shared about her safe place.  Treatment plan areas addressed: Depression, Anxiety, Mood dysregulation, Trauma and Coping Skills  Visit Diagnosis:       ICD-10-CM ICD-9-CM   1. Alcohol use disorder, moderate, dependence (CMS/HCC)  F10.20 303.90   2. Bipolar II disorder (CMS/HCC)  F31.81 296.89   3. Moderate benzodiazepine use disorder (CMS/HCC)  F13.20 304.10   4. Cannabis use disorder, mild, abuse  F12.10 305.20       Plan: Continue with PHP   Pt to F/U with treatment goals as outlined in treatment plan.  Pt to F/U with local ED/call 911 should SI/HI arises.    Radha Carpenter LCSW

## 2022-08-17 NOTE — PROGRESS NOTES
"Reviewed d/c medications with  Renate. She acknowledges understanding of medications.  Quetiapine is total of 75 mg at night currently; no other discrepancies noted during review. She expressed being somewhat overwhelmed with the amount of tasks she still has to complete for next level of treatment and new employer.  Supported Renate and offered advice with addressing current stress and time management.  Renate is very receptive to suggestions and states \"I am trying I'm really trying\".  Renate voices no other concerns or questions at this time.        Outpatient Encounter Medications as of 8/17/2022   Medication Sig   • busPIRone (BUSPAR) 10 mg tablet Take 1 tablet (10 mg total) by mouth 2 (two) times a day.   • lamoTRIgine (LaMICtal) 25 mg tablet Take 1 tablet (25 mg total) by mouth daily.   • QUEtiapine (SEROqueL) 25 mg tablet Take 1 tablet (25 mg total) by mouth nightly.   • QUEtiapine (SEROqueL) 50 mg tablet Take 1 tablet (50 mg total) by mouth nightly for 15 days. Take one-half tablet for the first night   • VYVANSE 60 mg capsule Take 60 mg by mouth once daily.       "

## 2022-08-18 ENCOUNTER — DOCUMENTATION (OUTPATIENT)
Dept: PSYCHIATRY | Facility: HOSPITAL | Age: 32
End: 2022-08-18
Payer: COMMERCIAL

## 2022-08-18 ENCOUNTER — PHP (OUTPATIENT)
Dept: PSYCHIATRY | Facility: HOSPITAL | Age: 32
End: 2022-08-18
Attending: NURSE PRACTITIONER
Payer: COMMERCIAL

## 2022-08-18 ENCOUNTER — NURSE ONLY (OUTPATIENT)
Dept: PSYCHIATRY | Facility: HOSPITAL | Age: 32
End: 2022-08-18
Payer: COMMERCIAL

## 2022-08-18 VITALS — HEART RATE: 126 BPM | DIASTOLIC BLOOD PRESSURE: 85 MMHG | SYSTOLIC BLOOD PRESSURE: 118 MMHG

## 2022-08-18 DIAGNOSIS — F31.81 BIPOLAR II DISORDER (CMS/HCC): Primary | ICD-10-CM

## 2022-08-18 DIAGNOSIS — F13.20 MODERATE BENZODIAZEPINE USE DISORDER (CMS/HCC): ICD-10-CM

## 2022-08-18 DIAGNOSIS — F42.2 MIXED OBSESSIONAL THOUGHTS AND ACTS: ICD-10-CM

## 2022-08-18 DIAGNOSIS — F12.10 CANNABIS USE DISORDER, MILD, ABUSE: ICD-10-CM

## 2022-08-18 DIAGNOSIS — F41.1 GENERALIZED ANXIETY DISORDER: Primary | ICD-10-CM

## 2022-08-18 DIAGNOSIS — F10.20 ALCOHOL USE DISORDER, MODERATE, DEPENDENCE (CMS/HCC): ICD-10-CM

## 2022-08-18 LAB
AMPHET UR QL SCN: NOT DETECTED
BARBITURATES UR QL SCN: NOT DETECTED
BENZODIAZ UR QL SCN: NOT DETECTED
CANNABINOIDS UR QL SCN: NOT DETECTED
CHOLEST SERPL-MCNC: 158 MG/DL
COCAINE UR QL SCN: NOT DETECTED
HDLC SERPL-MCNC: 73 MG/DL
HDLC SERPL: 2.2 {RATIO}
LDLC SERPL CALC-MCNC: 66 MG/DL
NONHDLC SERPL-MCNC: 85 MG/DL
OPIATES UR QL SCN: NOT DETECTED
PCP UR QL SCN: NOT DETECTED
TRIGL SERPL-MCNC: 97 MG/DL (ref 30–149)

## 2022-08-18 PROCEDURE — G0410 GRP PSYCH PARTIAL HOSP 45-50: HCPCS | Performed by: COUNSELOR

## 2022-08-18 RX ORDER — LAMOTRIGINE 25 MG/1
25 TABLET ORAL DAILY
Qty: 30 TABLET | Refills: 0 | Status: SHIPPED | OUTPATIENT
Start: 2022-08-18 | End: 2022-09-17

## 2022-08-18 RX ORDER — QUETIAPINE FUMARATE 50 MG/1
50 TABLET, FILM COATED ORAL NIGHTLY
Qty: 30 TABLET | Refills: 0 | Status: SHIPPED | OUTPATIENT
Start: 2022-08-18 | End: 2022-09-17

## 2022-08-18 NOTE — PROGRESS NOTES
LCSW checked in with pt per pt request. She shared texts from her  that were distressing, LCSW offered supportive listening and redirection for pt to focus on her last day. Pt willing to complete full last day of PHP and will review d/c with LCSW later today. Pt states she will call her OP psychiatrist on the lunch break today to schedule next session.

## 2022-08-18 NOTE — DISCHARGE SUMMARY
PHP Discharge Summary     Patient Name: Renate Wallace  : 1990  Treatment start date: 8/3/22  Treatment end date: 22    Discharge Type: Mental Health  Discharge Reason: Program Complete    Reason for admission and treatment: Renate presented for PHP tx to address depression, anxiety, and substance use. Sx include insomnia, intrusive thoughts, obsessions, compulsions, avoidance, panic attacks, worry, social isolation, loss of interest in activities, feelings of low self-worth. Pt presented for evaluation for mental health PHP after being evaluated by Admissions at [facility], following completion of [facility] dual dx IOP (May - 2022) for alcohol use, benzodiazepine use, and bipolar d/o.    Services offered and response to treatment: Renate participated in 10 days of PHP groups, psychiatry and nursing visits, individual therapy and a family session with her mother and sister. Renate reported being able to abstain from substances during PHP tx and completed UDS. She reported improvements in mood with continued anxiety in the context of receiving news about a thyroid issue, as well as in anticipation of starting a new job following PHP.    Summary of Psychiatric Care:  Brief Psychiatric Formulation: Renate is a 31 year old female with a reported history of bipolar disorder and OCD as well as alcohol, benzodiazepine, and cannabis use disorders admitted to [facility] after initial evaluation by [facility].  She reports recent precipitating factors for both her mood deterioration and substance use of work stressors and isolation during the COVID pandemic, as well as poor relationship with her .     She has tolerated restart of her previous regimen of buspirone and lamotrigine (started 22).  Buspirone was increased to a final dose of 10 mg twice daily to target anxiety.  Quetiapine was added and increased to a final dose of 75 mg to target anxiety and mood stability, as well as assist  with insomnia, and was tolerated well.  Discussed metabolic risks with quetiapine and potential of tapering off once lamotrigine dose could be increased.  She was offered naltrexone to assist with her sobriety though this was declined as she felt she was able to maintain her sobriety without. Reviewed her recent fill of alprazolam and missing her ordered urine drug screen for the week, a requirement for her to complete to remain in the program, which she agreed to complete.    Pt is at chronic elevated risk of intentional harm to self given hx of anxiety, depression, and substance use. Her acute risk is elevated by ongoing depression/anxiety sx and recent substance use,  though this acute risk is mitigated by lack of current suicidal ideation, lack of plan/intent, commitment to children, goal of returning to work this year, treatment-seeking behavior, and future orientation.  She has benefited from PHP programming and recent medication changes.   In sum, her acute risk of intentional harm to self is not significantly elevated from usual baseline as to warrant ongoing PHP treatment and she is appropriate to transition to a dual diagnosis IOP.        Current Outpatient Medications:   •  busPIRone (BUSPAR) 10 mg tablet, Take 1 tablet (10 mg total) by mouth 2 (two) times a day., , Disp: 60 tablet, Rfl: 0  •  lamoTRIgine (LaMICtal) 25 mg tablet, Take 1 tablet (25 mg total) by mouth daily., , Disp: 30 tablet, Rfl: 0  •  QUEtiapine (SEROqueL) 25 mg tablet, Take 1 tablet (25 mg total) by mouth nightly., , Disp: 30 tablet, Rfl: 0  •  QUEtiapine (SEROqueL) 50 mg tablet, Take 1 tablet (50 mg total) by mouth nightly for 15 days. Take one-half tablet for the first night, , Disp: 15 tablet, Rfl: 0  •  VYVANSE 60 mg capsule, Take 60 mg by mouth once daily., , Disp: , Rfl:     Client status - Condition upon discharge: Pt was oriented x4. Pt's mood is stable upon discharge. Prognosis is fair.  Screening Assessments done this  visit:        [place]  Depression Screening: Not indicated           Clinical concerns to be addressed in continued care: Renate should work with her IOP treatment team to complete the recommended course of care. Renate would benefit from ongoing supports for her substance use hx, such as through AA/NA, and should abstain from all non-prescribed substances. Renate should work consistently with an outpatient therapist for continued support in mood stability and in managing triggers, particularly in her interpersonal relationships.      Aftercare appointments: Renate has an intake for dual dx SUDS IOP with [facility] on 22 from 6-8 pm. Renate should schedule with her outpatient psychiatrist for a follow up within 30 days of PHP discharge. Renate has been provided with referrals and recommended to engage in OP therapy.    Special Instructions: None    Medical Follow Up needed?  Yes; Other: follow up with PCP and endocrinologist for medical needs         Mental Health Crisis Support:    [facility] Crisis Number: [phone]   [facility] Crisis Number: [phone]   [facility] Crisis Number: [phone]   [facility] Crisis Number: [phone]      In case of Mental Health Crisis, go to the closest Emergency Department, call , or contact the National Suicide Prevention Hotline at: 1-116.128.8446.  You may also call, text or chat the National Suicide Prevention Hotline at .     Other Support Agencies:  [place] National [place] of Mental Illness: [phone]    [place] Advocacy System: [phone]    Depression & Bipolar [place]: [phone]      Patient offered a copy of plan? Yes    Patient provided a copy? Yes        Radha Carpenter LCSW @ 10:17 AM

## 2022-08-18 NOTE — GROUP NOTE
Date:  8/18/2022  Start Time:  12:10 PM  End Time:   1:10 PM  Renate Wallace, YOB: 1990  10 Attended Nursing Ed Group     The Nursing Education Group Topic is Sleep Hygiene.  RN began group by discussing sleep hygiene techniques and provided group members with a handout. Group members were encouraged to identify sleep suggestions that they would like to try including sleep diaries, essential oils,  medications, etc. RN led the group through a time of reflection on prioritizing sleep hygiene.    Jacobo Benitez DO was on site when services rendered.          Patient  was an active group participant.  Assessment/observation of group participation/presentation: Renate was supportive of peers, asked appropriate questions and also discussed additional intervention of aromatherapy that she uses with her sleep hygiene routine.  Towards the end of group Renate began to exhibit some anxiety with increased heart rate.     Visit Diagnosis:     ICD-10-CM ICD-9-CM   1. Bipolar II disorder (CMS/HCC)  F31.81 296.89   2. Alcohol use disorder, moderate, dependence (CMS/HCC)  F10.20 303.90   3. Moderate benzodiazepine use disorder (CMS/HCC)  F13.20 304.10   4. Cannabis use disorder, mild, abuse  F12.10 305.20   5. Mixed obsessional thoughts and acts  F42.2 300.3       Plan:  Continue with PHP  Pt to F/U with treatment goals as outlined in treatment plan.  Pt to F/U with local ED/call 911 should SI/HI arises.    Lizzeth Cristina RN

## 2022-08-18 NOTE — GROUP NOTE
"Date:  8/18/2022  Start Time:  10:40 AM  End Time:  11:40 AM  Renate Wallace, YOB: 1990   11 members attended group today.    Group Focus:  Goal of group was to establish and build social support, review coping skills, normalize the struggles of being human, and increase self awareness and self compassion.    About the Group:  Clinician started group with a prompting quote/song to facilitate group discussion.  Group engaged in active and supportive discussion. Topics discussed included catastrophic thinking and \"checking facts\" when anxiety intensifies, communication and boundary setting relationships, and how to remain grounded when feeling triggered by interpersonal relationships.  Group members were able to offer insightful and supportive feedback and suggestions about how to manage difficult situations.  Group ended with a meditation/song.  Jacobo Benitez DO was onsite when services were rendered.        Patient  was an active group participant.  Assessment/observation of group participation/presentation: Pt shared about some of her frustration this morning in relation to messages that she received from her . Pt inquired about how to remain calm and set boundaries with someone when the boundaries are not upheld or respected. LPC and group members come up with examples of grounding and coping skills to help support pt. LPC educated group members about communication and behavioral patterns that continue to engage stress or toxicity, and how to utilize [place] of control as well as coping skills to help change those dynamics. Pt was open with peers about willing to receive support.   Treatment plan areas addressed: Depression, Anxiety, Relationship issues/Communication skills, Employment/vocational stress, Self Care, Mood dysregulation, Parenting Stress, Coping Skills, Mindfulness, CBT skills, Building strengths/resilience, Self-compassion and Other: SUDS  Visit Diagnosis:      ICD-10-CM ICD-9-CM "   1. Bipolar II disorder (CMS/HCC)  F31.81 296.89   2. Alcohol use disorder, moderate, dependence (CMS/HCC)  F10.20 303.90   3. Moderate benzodiazepine use disorder (CMS/HCC)  F13.20 304.10   4. Cannabis use disorder, mild, abuse  F12.10 305.20   5. Mixed obsessional thoughts and acts  F42.2 300.3       Plan: Continue with PHP   Pt to F/U with treatment goals as outlined in treatment plan.  Pt to F/U with local ED/call 911 should SI/HI arises.    Edward Garcia, LPC

## 2022-08-18 NOTE — PATIENT INSTRUCTIONS
Patient Name: Renate Wallace  : 1990  Treatment start date: 8/3/22  Treatment end date: 22    Discharge Type: Mental Health  Discharge Reason: Program Complete    Reason for admission and treatment: Renate presented for PHP tx to address depression, anxiety, and substance use. Sx include insomnia, intrusive thoughts, obsessions, compulsions, avoidance, panic attacks, worry, social isolation, loss of interest in activities, feelings of low self-worth. Pt presented for evaluation for mental health PHP after being evaluated by Admissions at [facility], following completion of [facility] dual dx IOP (May - 2022) for alcohol use, benzodiazepine use, and bipolar d/o.    Services offered and response to treatment: Renate participated in 10 days of PHP groups, psychiatry and nursing visits, individual therapy and a family session with her mother and sister. Renate reported being able to abstain from substances during PHP tx and completed UDS. She reported improvements in mood with continued anxiety in the context of receiving news about a thyroid issue, as well as in anticipation of starting a new job following PHP.    Summary of Psychiatric Care:  Brief Psychiatric Formulation: Renate is a 31 year old female with a reported history of bipolar disorder and OCD as well as alcohol, benzodiazepine, and cannabis use disorders admitted to [facility] after initial evaluation by [facility].  She reports recent precipitating factors for both her mood deterioration and substance use of work stressors and isolation during the COVID pandemic, as well as poor relationship with her .     She has tolerated restart of her previous regimen of buspirone and lamotrigine (started 22).  Buspirone was increased to a final dose of 10 mg twice daily to target anxiety.  Quetiapine was added and increased to a final dose of 75 mg to target anxiety and mood stability, as well as assist with insomnia, and was  tolerated well.  Discussed metabolic risks with quetiapine and potential of tapering off once lamotrigine dose could be increased.  She was offered naltrexone to assist with her sobriety though this was declined as she felt she was able to maintain her sobriety without. Reviewed her recent fill of alprazolam and missing her ordered urine drug screen for the week, a requirement for her to complete to remain in the program, which she agreed to complete.    Pt is at chronic elevated risk of intentional harm to self given hx of anxiety, depression, and substance use. Her acute risk is elevated by ongoing depression/anxiety sx and recent substance use,  though this acute risk is mitigated by lack of current suicidal ideation, lack of plan/intent, commitment to children, goal of returning to work this year, treatment-seeking behavior, and future orientation.  She has benefited from PHP programming and recent medication changes.   In sum, her acute risk of intentional harm to self is not significantly elevated from usual baseline as to warrant ongoing PHP treatment and she is appropriate to transition to a dual diagnosis IOP.        Current Outpatient Medications:     busPIRone (BUSPAR) 10 mg tablet, Take 1 tablet (10 mg total) by mouth 2 (two) times a day., , Disp: 60 tablet, Rfl: 0    lamoTRIgine (LaMICtal) 25 mg tablet, Take 1 tablet (25 mg total) by mouth daily., , Disp: 30 tablet, Rfl: 0    QUEtiapine (SEROqueL) 25 mg tablet, Take 1 tablet (25 mg total) by mouth nightly., , Disp: 30 tablet, Rfl: 0    QUEtiapine (SEROqueL) 50 mg tablet, Take 1 tablet (50 mg total) by mouth nightly for 15 days. Take one-half tablet for the first night, , Disp: 15 tablet, Rfl: 0    VYVANSE 60 mg capsule, Take 60 mg by mouth once daily., , Disp: , Rfl:     Client status - Condition upon discharge: Pt was oriented x4. Pt's mood is stable upon discharge. Prognosis is fair.  Screening Assessments done this visit:        [place]   Depression Screening: Not indicated           Clinical concerns to be addressed in continued care: Renate should work with her IOP treatment team to complete the recommended course of care. Renate would benefit from ongoing supports for her substance use hx, such as through AA/NA, and should abstain from all non-prescribed substances. Renate should work consistently with an outpatient therapist for continued support in mood stability and in managing triggers, particularly in her interpersonal relationships.      Aftercare appointments: Renate has an intake for dual dx SUDS IOP with [facility] on 8/18/22 from 6-8 pm. Renate should schedule with her outpatient psychiatrist for a follow up within 30 days of PHP discharge. Renate has been provided with referrals and recommended to engage in OP therapy.    Special Instructions: None    Medical Follow Up needed?  Yes; Other: follow up with PCP and endocrinologist for medical needs         Mental Health Crisis Support:    [facility] Crisis Number: [phone]   [facility] Crisis Number: [phone]   [facility] Crisis Number: [phone]   [facility] Crisis Number: [phone]      In case of Mental Health Crisis, go to the closest Emergency Department, call 9-1-1, or contact the National Suicide Prevention Hotline at: 1-856.345.9590.  You may also call, text or chat the National Suicide Prevention Hotline at 9-8-8.     Other Support Agencies:  [place] National [place] of Mental Illness: 976.294.2770    [place] Advocacy System: 369.658.1990    Depression & Bipolar [place]: [phone]      Patient offered a copy of plan? Yes    Patient provided a copy? Yes        Radha Carpenter, MEGHAN @ 10:17 AM

## 2022-08-18 NOTE — GROUP NOTE
Date: 8/18/2022  Start Time:  1:20 PM  End Time:   2:20 PM  Renate Wallace, YOB: 1990,  was an active group participant.    Wrap Up Group  10 attended group today.   Group Focus: Goal of group was to wrap up and process the treatment day.  Assist  members in planning for the evening ahead and to assess and plan surrounding any  safety needs.      About the Group:  Clinician reviewed group members Afternoon  Reflection Sheets and did a verbal check in with each group member regarding safety  (SI/HI/self harm) and any necessary safety planning .  Session ended with a brief  meditation/calming activity.  Jacobo Benitez DO was onsite when  services rendered.        Afternoon Reflection:   Depression:  0/5  Anxiety:  3/5  Anger:  0/5  Thoughts of taking one's own life today?:  0/5 - None  Self Injury:  0/5 - None  Engaged in self-injury?  No  Thoughts of hurting other people today?:  0/5 - None  Compulsive Behaviors?:  0-None  Compulsive Behaviors?: No  Are you able to follow our safety plan?: Yes      I can/will:  Call someone, Use a coping skill, Use grounding with my 5 senses, Journal and Listen to music  The most significant moment of the day or insight for me was...:  Seeing my heart rate spike as a result of anxiety due to things I can't control, how I will really johnathan in on focusing on myself  Three things I am grateful for today are:  This group! Getting to start a new job tomorrow! The beautiful weather  Did you meet your goal for today? If not, what might you do tomorrow or this week to achieve it?:  Yes  My evening self-care plan is:  Enjoying my evening with my kids! Patting my own back for my completion of this program. Clean linens?      Pt treatment plan areas addressed: Depression, Anxiety, Self Care, Mood dysregulation and Coping Skills    Visit Diagnosis:      ICD-10-CM ICD-9-CM   1. Bipolar II disorder (CMS/HCC)  F31.81 296.89   2. Alcohol use disorder, moderate, dependence (CMS/HCC)   "F10.20 303.90   3. Moderate benzodiazepine use disorder (CMS/HCC)  F13.20 304.10   4. Cannabis use disorder, mild, abuse  F12.10 305.20   5. Mixed obsessional thoughts and acts  F42.2 300.3       Assessment/Observations:  Renate discussed focusing on her own reactions and the [place] of control as she discharges from [facility]. She encouraged the group that tx is a marathon and to \"stay vigilant\" and seems hopeful for her next steps.   Plan: Discharge from [facility]. Patient to F/U with IOP 3 days a week, 3 hours per day   Pt to F/U with treatment goals as outlined in treatment plan.  Pt to F/U with local ED/call 911 should SI/HI arises.    Radha Carpenter LCSW        "

## 2022-08-18 NOTE — PROGRESS NOTES
Renate reported that during group her smart watch alerted her that her heart rate was elevated.  Checked pulse with pulse ox which read 101-120 with o2 sat of 100%.  Renate denies anxiety, SOB, chest pain, light headedness, palpitations; does not appear to be in any distress.  Pulse checked manually rapid and thready bilaterally.

## 2022-08-18 NOTE — GROUP NOTE
Date: 8/18/2022  Start Time:   9:30 AM  End Time: 10:30 AM    Renate Wallace, YOB: 1990,  was an active group participant.    Community Check In Group  10 attended group today.     Group Focus: Goal of group was to welcome new and returning PHP members to the PHP, check in regarding group member needs, and assess safety.    About the Group: Clinician reviewed [facility] Group Code of Conduct and answered any questions that arose.  Clinician welcomed new members to the group and facilitated brief introductions of group members to one another.  Introduced topic/theme for the treatment day:Interpersonal Effectiveness.  Encouraged group members to request support throughout the day as needed.  Clinician reviewed group members’ Morning Reflection Sheets and did a verbal check in with each group member regarding safety (SI/HI/self harm), safety planning, medication compliance, if they needed to consult with anyone today and individual treatment needs/goals for the day.  Session ended with a brief meditation/calming activity.   Jacobo Benitez DO was on site when services rendered.      Morning Reflection:   Depression:  0/5  Anxiety:  4/5  Anger:  2/5  Thoughts of taking one's own life today?:  0/5 - None  Self Injury:  0/5 - None  Engaged in Self Injury since yesterday: No    Thoughts of hurting other people today?:  0/5 - None  Compulsive Behaviors?:  0-None  Compulsive Behaviors?:  No  Are you able to follow your safety plan?: Yes      I can/will:  Call someone, Journal, Use a coping skill, Listen to music and Use grounding with my 5 senses  Substance Use: No    Self Care: Yes      I completed my self care activity last night::  Early bed time, did something for others     I am satisfied with my daily hygiene: Yes    Nourish:  Yes    Number of meals eaten yesterday:  2    Describe:  Normal and Nutritious  Sleep:  Yes    Used sleep aid?: Yes      Describe sleep:  Restful    Number of hours:  8  Social Interaction:   Yes    Social Interaction:  Excessive    Excessive with:  Family  Physical Activity:  No  Mindful Activity:  Yes    Describe Practice:  Driving and listening to music   Medication:  Yes    Medication:  Prescribed  Thought Content:  Clear  Which coping skills did you use yesterday? How did they help or not help?  What barriers did you face?:  Social interaction, community service, [place] of control  Pt reported significant or positive event/step:  I had family meeting which was a relief. I felt loved and supported. I cleaned out car   Pt identified goal for the treatment day:  Topic of today to help with relationship issues/stress   Pt treatment plan areas addressed:  Depression, Anxiety, Relationship issues/Communication Skills, Self care, Parenting stress, Employment/vocational stress, Chronic Pain/Medical Issues, Coping skills, Mindfulness, CBT skills, Building strengths/resilience and Self-compassion  Pt requested consult with:  None      Visit Diagnosis:      ICD-10-CM ICD-9-CM   1. Bipolar II disorder (CMS/[facility])  F31.81 296.89   2. Alcohol use disorder, moderate, dependence (CMS/[facility])  F10.20 303.90   3. Moderate benzodiazepine use disorder (CMS/[facility])  F13.20 304.10   4. Cannabis use disorder, mild, abuse  F12.10 305.20   5. Mixed obsessional thoughts and acts  F42.2 300.3       Assessment/Observations:  Pt shared that she is feeling anxious today about starting a new job and receiving stressful messages from her  this morning. Pt shared that she may want to talk about this in open process group today given the topic for today is Interpersonal Effectiveness. Pt shared that she is graduating from [employer] today. Pt shared that she was feeling highly anxious yesterday leading up to her family session. Pt shared that she felt very supported and loved by her mother and sister in the session. Pt shared that she felt relieved to have it and be done with, but also acknowledged that it went better than she  anticipated.   Plan:  Continue with PHP   Pt to F/U with treatment goals as outlined in treatment plan.  Pt to F/U with local ED/call 911 should SI/HI arises.    Edward Garcia, LPC

## 2025-05-28 NOTE — PROGRESS NOTES
PHP Admission note     Renate Wallace is a 31 y.o. female who presents for Follow-up and Med Management.        HPI  UDS from admission screening was positive for only stimulants and meeting today to discuss program monitoring and medication management.      Had thyroid biopsy- reports no significant concern will have more conclusive results in a week.      Mood today is anxious with some depression this morning.  Felt this morning's groups were helpful.  Anxious about the weekend and her responses to her .      Discussed previous medication trials.  She was on naltrexone after [facility] treatment in 2015, uncertain if helpful but notes she responded well to all treatment then.  May be interested in starting naltrexone currently as she notes upcoming weddings in the fall where there will be alcohol.      Psychiatric ROS:  Sleep: interrupted, though slept 8 hrs last night  Appetite: Fair-poor due to anxiety, struggles to eat in the morning.  Substance use: denies, cravings only for nicotine  SI/HI: denies, future oriented    PDMP  Vyvanse 60 mg #30 filled 7/16/2022  Alprazolam 0.5 mg #60 filled 7/16/22       Allergies: No Known Allergies      Current Outpatient Medications:   •  VYVANSE 60 mg capsule, Take 60 mg by mouth once daily., , Disp: , Rfl:     Review of Systems   Constitutional: Negative for fatigue.   Neurological: Negative for dizziness and tremors.     Objective   There were no vitals taken for this visit.    MENTAL STATUS EXAM  Appearance: well groomed and appropriate attire  Gait and Motor: no abnormal movements  Speech: normal rate/rhythm/volume  Mood: anxious  Affect: good eye contact mask on  Associations: coherent  Thought Process: goal-directed  Thought Content: no auditory or visual hallucinations. and appropriate to situation  Suicidality/Homicidality: denies and no thoughts of harm toward child/children  Judgement/Insight: acknowledges illness and fair insight and judgement  Orientation: person,  place, time and situation  Attention: alert  Language: normal       [place] Suicide Severity Rating Scale:  Completed 8/3/22  Safe-T Assessment:  Not indicated        Based on [place] Suicide Screen and current clinical assessment, patient is determined Low Risk.  Suicide Risk/Suicidal Ideation will not be added to patient's treatment plan.       Labs  8/3/2022  UDS positive for stimulants    7/2022  CBC WNL  CMP no significant abnormals  TSH WNL     7/30/22 (from ED visit)  CMP no significant abnormals, LFTs WNL  UDS positive for cannabis and amphetamines/methamphetamines  CBC WNL  Ethanol level 295    ECG   None in Epic    Physical Exam    Brief Psychiatric Formulation: Renate is a 31 year old female with a reported history of bipolar disorder and OCD as well as alcohol, benzodiazepine, and cannabis use disorders presenting for [facility] admission after initial evaluation by [facility].  She reports recent precipitating factors for both her mood deterioration and substance use of work stressors and isolation during the COVID pandemic, as well as poor relationship with her .     Reviewed UDS results and expected weekly UDS.    Renate is willing to restart lamotrigine for mood stability, aware of SJS and need to restart at 25 mg dose as she has been off for three weeks.  She was agreeable to restart buspirone and potentially increased to a more effective dose.    Discussed starting trial of quetiapine to address depression, impulsivity, sleep, and anxiety while lamotrigine dose remains low.  Reviewed potential SEs with EPS, metabolic risks, and orthostatic hypotension.     Pt is at chronic elevated risk of intentional harm to self given hx of anxiety, depression, and substance use. Her acute risk is elevated by ongoing depression/anxiety sx and recent substance use,  though this acute risk is mitigated by lack of current suicidal ideation, lack of plan/intent, commitment to children, goal of returning to work this year,  show treatment-seeking behavior, and future orientation. In sum, her acute risk of intentional harm to self is not significantly elevated from usual baseline as to warrant hospitalization, but given her degree of symptoms and impact on functioning, she is appropriate for PHP. She denies any thoughts of harming others and has no history of violence, so risk to others is low.  Will need to continue to monitor need for a HLOC for SUDs treatment.    Plan:   • Admit to PHP 8/5/2022  • Weekly weights and UDS  • Restart lamotrigine 25 mg daily  • Restart buspirone 5 mg BID, plan to increase next week  • Start quetiapine 25 mg QHS for the first night, then increase to 50 mg nightly  • Encouraged to hold Vyvanse  • T/c naltrexone start next week  • Aftercare planning: Dual diagnosis IOP, individual therapy, return to OP psychiatry  • Follow up: 8/8/22  • Patient to F/U with treatment goals as outlined in treatment plan.  • Patient to F/U with local ED or call 911 should SI/HI arise.        I certify that Renate Wallace requires [facility] level of care to treat her Bipolar II disorder (CMS/HCC)  (primary encounter diagnosis)    Alcohol use disorder, moderate, dependence (CMS/HCC)    Moderate benzodiazepine use disorder (CMS/HCC)    Cannabis use disorder, mild, abuse    Mixed obsessional thoughts and acts  .  Without this medically necessary care as outlined in the individualized multidisciplinary treatment plan, inpatient psychiatric hospitalization would be required.        Visit Diagnosis:    ICD-10-CM ICD-9-CM   1. Bipolar II disorder (CMS/HCC)  F31.81 296.89   2. Alcohol use disorder, moderate, dependence (CMS/HCC)  F10.20 303.90   3. Moderate benzodiazepine use disorder (CMS/HCC)  F13.20 304.10   4. Cannabis use disorder, mild, abuse  F12.10 305.20   5. Mixed obsessional thoughts and acts  F42.2 300.3              CATRACHITA Jackson @ 11:28 AM